# Patient Record
Sex: FEMALE | Race: WHITE | Employment: FULL TIME
[De-identification: names, ages, dates, MRNs, and addresses within clinical notes are randomized per-mention and may not be internally consistent; named-entity substitution may affect disease eponyms.]

---

## 2017-05-27 ENCOUNTER — HEALTH MAINTENANCE LETTER (OUTPATIENT)
Age: 27
End: 2017-05-27

## 2019-09-28 ENCOUNTER — HEALTH MAINTENANCE LETTER (OUTPATIENT)
Age: 29
End: 2019-09-28

## 2020-03-12 ENCOUNTER — TRANSFERRED RECORDS (OUTPATIENT)
Dept: HEALTH INFORMATION MANAGEMENT | Facility: CLINIC | Age: 30
End: 2020-03-12

## 2020-03-20 ENCOUNTER — TRANSFERRED RECORDS (OUTPATIENT)
Dept: HEALTH INFORMATION MANAGEMENT | Facility: CLINIC | Age: 30
End: 2020-03-20

## 2020-05-14 ENCOUNTER — PRE VISIT (OUTPATIENT)
Dept: NEUROLOGY | Facility: CLINIC | Age: 30
End: 2020-05-14

## 2020-05-14 NOTE — TELEPHONE ENCOUNTER
FUTURE VISIT INFORMATION      FUTURE VISIT INFORMATION:    Date: 5/19/2020    Time: 1030AM     [place] [facility] [place]  REFERRAL INFORMATION:    Referring provider:  Self     Referring providers clinic:      Reason for visit/diagnosis  Migraines     RECORDS REQUESTED FROM:       Clinic name Comments Records Status Imaging Status   [facility]   Requested  Requested          [facility] Head 3/5/2020 Care Everywhere Requested to PACS

## 2020-05-19 NOTE — PROGRESS NOTES
"Melia Braxton is a 29 year old female who is being evaluated via a billable video visit.      The patient has been notified of following:     \"This video visit will be conducted via a call between you and your physician/provider. We have found that certain health care needs can be provided without the need for an in-person physical exam.  This service lets us provide the care you need with a video conversation.  If a prescription is necessary we can send it directly to your pharmacy.  If lab work is needed we can place an order for that and you can then stop by our lab to have the test done at a later time.    Video visits are billed at different rates depending on your insurance coverage.  Please reach out to your insurance provider with any questions.    If during the course of the call the physician/provider feels a video visit is not appropriate, you will not be charged for this service.\"    Patient has given verbal consent for Video visit? Yes    How would you like to obtain your AVS? MyChart    Patient would like the video invitation sent by: Text to cell phone: [phone]    Will anyone else be joining your video visit? No        Video-Visit Details  Type of service:  Video Visit  Video Start Time: 1:03pm  Video End Time: 2:04pm  Originating Location (pt. Location): Home  Distant Location (provider location):  [facility] NEUROLOGY   Platform used for Video Visit: [employer]  Ernesto ESTEBAN Cha, MD    The clinical encounter between myself and Melia Braxton was performed utilizing a real-time video connection between my location and the patient's location, which was confirmed during the encounter.  Consent was obtained from the patient to perform this clinical encounter via telehealth modality.     [facility]    Neurology Consult    5/22/2020      Melia Braxton MRN# [id number]   YOB: 1990 Age: 29 year old      Primary care provider:   No primary care " provider on file.    Requesting provider:    Self-referred     Reason for Consult:   Headache      IMPRESSIONS:  29-year-old woman with history of significant concussion with impact on the right side of the head now with 3 episodes of prolonged migraine headache 1 of which was associated with visual aura.  There is concurrent neck stiffness on the same side of the head as the eye pain and the prior head injury.  Stress and perhaps coming off of the methamphetamine may have contributed to the initial headache.  Given the unusual nature of the headache and the prolonged time course we should rule out a structural cause of her symptoms.    Recommendations:  1.  Ultrasound neck  2.  CT chest  3.  Potential treatments will depend on imaging results but I discussed both physical therapy as well as a trial of a calcium channel blocker if there are no structural abnormalities to explain her symptoms  4.  Follow-up after imaging    HISTORY OF PRESENT ILLNESS:  Melia Braxton is a 29 year old female with PMH significant for concussion and attention deficit disorder previously on Ritalin (methamphetamine) who developed new onset of migraine headache with aura after stopping the Ritalin.  She also notes at the time of onset that she was undergoing a lot of stress.      She reports being out with her boyfriend at the onset of her first headache.  She was in a store where the lights were very bright and she started to experience spots in her vision and then eventual loss of vision.  The visual loss lasted about 20 minutes.  She was brought home and made to rest.  She remembers being extremely nauseated and then developing a severe headache.  The headache was a severe pain going through the right eye.  This lasted for 4 days before she went to the emergency room where she received a migraine cocktail which helped some of the pain.  She reports having had a negative head CT at the time.  The headache did not resolve until about  14 days had passed.  She was also very light sensitive during that entire time.  She also remembers a second visual aura occurring when she had tried to go to the grocery store during that.  The headache eventually resolved after she took some cannabis oil as well as smoked cannabis.  She was able to sleep deeply and then woke up the next day with resolution of the pain as well as light sensitivity and nausea.  The headache then converted to more of a global pain around the head rather than a sharp pain through the eye.      The patient does note being treated for attention deficit disorder that was diagnosed last November and having been initially put on Adderall which was then switched to Ritalin in January.  The dosing was quite inconsistent.  She was prescribed a dose of 10 mg/day but she had been splitting the dose and taking it maybe 2-3 times per week.  She had stopped taking the Ritalin altogether at the end of February.    Then around April 1-2, when her period started, she had another migraine but not as severe as the first one. It was still debilitating, however.  She did not have a visual aura but she did have the pain in the right eye again.  There was again nausea and light sensitivity. The cannabis did not work as well then.  This headache lasted 19 days.  She was treated with some sumatriptan 50mg with a second dose after two hours, but that didn't help.     On April 27-28th, she had her 3rd episode. She took the sumatriptan 25mg four times a day spread out over two days and that really seemed to reduce the headache.  This episode lasted about 15 days.  There is still some residual pressure over the right eye despite the severity of the pain being reduced..      She did not notice that the headache was worse laying down but it did wake her up in the middle of the night.  The headaches were worsened by being on the computer.  Sleep made it better.  The headache is more of pressure but when it's severe  it's more sharp and knife-like.  The eyeball cavity itself it feels like it is sensitive.  The eye ball does not change color.  But, the right eye does tear.  There is some right eyelid is drooping but not anymore during the headache.  There may be some nasal congestion but not specifically in the right side.    There is some right sided neck stiffness.  This started in March, also, at the time of the initial headache.  There is no neck swelling.  She may have had some right ear pressure.  There is no tinnitus or hearing loss.  She has felt clumsier and more imbalanced during this time.   There is no chest pain or chest heaviness.  There is no shortness of breath.  She did feel lethargic and tired.  There was never any arm symptoms.  There was no swelling in her face, chest, or arms.    On 2017, a falling [place] tree fell on the right side of her face.  She had had some alcohol that same night.  The next day she was very emotional, felt hung-over, and went to the ER.  MRI was normal.  She had had a headache then that lasted for about 7 months.  She was hypersomnolent for 3 months and incredibly light and sound sensitive and had chronic all-over head headache.  She was cared for by a neurologist and chiropractor and gradually improved.  She also tried a ketogenic diet for a month which was helpful after about 3 weeks.  This is when the headache improved.    She walks her dog an hour a day.  She does pilates.  She does not do any weight-lifting.  She has not had any recent head trauma.  She had bilateral breast implants in .    PAST MEDICAL HISTORY:  1. Asthma--hasn't used inhaler in years  2. Concussion as described above  3. Diagnosed with ADD earlier this year    PAST SURGICAL HISTORY:  1. Gallbladder and appendix removal   2. Breast implants in      SOCIAL HISTORY: Single, never smoker, occasional alcohol    FAMILY HISTORY:  Mother: Age 69, Healthy  Father:  age 56, multiple myeloma and  "amyloidosis 2006  Brother: schizoaffective disorder, ADD  Brother: ADD  Sister: Bad anxiety  Maternal cousins with migraine    ALLERGIES:  No Known Allergies     MEDICATIONS:    Current Outpatient Medications:      ALBUTEROL INHALER 17GM, This product no longer available, Disp: , Rfl:      Levalbuterol Tartrate (XOPENEX HFA IN), , Disp: , Rfl:      paragard intrauterine copper, 1 each by Intrauterine route once, Disp: , Rfl:   Sumatriptan 50mg prn    REVIEW OF SYSTEM:  Skin: negative  Eyes: negative  Ears/Nose/Throat: negative  Respiratory: negative  Cardiovascular: palpitations related to anxiety  Gastrointestinal: constipation leading up to the migraine, couple days  Genitourinary: negative  Musculoskeletal: negative  Neurologic: negative  Psychiatric: anxiety   Hematologic/Lymphatic/Immunologic: negative  Endocrine: negative    PHYSICAL EXAM:    General: Patient is no apparent distress.  Patient was sitting in her car in the  seat but not driving during the evaluation.    Mental Status: Alert and oriented to person, place, time, and situation.  Able to provide a good history.     Cranial Nerves: Visual fields full to confrontation.  Extraocular movements full.  No nystagmus.  Normal conversational hearing.  Face symmetric with normal movements. Tongue midline.  Normal shoulder elevation.        Adson's test for Thoracic Outlet Syndrome:  Negative    DATA:  MR angiogram 3- performed at [facility] was read as normal.  Accompanying MRI of the brain without contrast was read as \"near normal brain MRI aside from minimal punctate nonspecific supratentorial white matter T2 hyperintense foci, stable and of doubtful clinical significance.  Broad differential exists, including migrainous changes, chronic microvascular disease, gliosis from remote inflammatory or other vascular process.  The white matter changes are not suggestive for demyelination.    60-minutes were spent in evaluation, " examination, and counseling in real time with more than 50% spent in counseling and coordination of care.  After a review of the patient s situation, this visit was changed from an in-person visit to a video visit to reduce the risk of COVID-19 exposure.   The patient is being evaluated via a billable video visit.

## 2020-05-22 ENCOUNTER — VIRTUAL VISIT (OUTPATIENT)
Dept: NEUROLOGY | Facility: CLINIC | Age: 30
End: 2020-05-22
Payer: COMMERCIAL

## 2020-05-22 DIAGNOSIS — G54.0 THORACIC OUTLET SYNDROME: Primary | ICD-10-CM

## 2020-05-22 NOTE — LETTER
"5/22/2020     RE: Melia Braxton  [address]  [place] [address]     Dear Colleague,    Thank you for referring your patient, Melia Braxton, to the [facility] NEUROLOGY at [facility]. Please see a copy of my visit note below.    Melia Braxton is a 29 year old female who is being evaluated via a billable video visit.      The patient has been notified of following:     \"This video visit will be conducted via a call between you and your physician/provider. We have found that certain health care needs can be provided without the need for an in-person physical exam.  This service lets us provide the care you need with a video conversation.  If a prescription is necessary we can send it directly to your pharmacy.  If lab work is needed we can place an order for that and you can then stop by our lab to have the test done at a later time.    Video visits are billed at different rates depending on your insurance coverage.  Please reach out to your insurance provider with any questions.    If during the course of the call the physician/provider feels a video visit is not appropriate, you will not be charged for this service.\"    Patient has given verbal consent for Video visit? Yes    How would you like to obtain your AVS? [facility]    Patient would like the video invitation sent by: Text to cell phone: [phone]    Will anyone else be joining your video visit? No        Video-Visit Details  Type of service:  Video Visit  Video Start Time: 1:03pm  Video End Time: 2:04pm  Originating Location (pt. Location): Home  Distant Location (provider location):  [facility] NEUROLOGY   Platform used for Video Visit: Adina ESTEBAN Cha, MD    The clinical encounter between myself and Melia Braxton was performed utilizing a real-time video connection between my location and the patient's location, which was confirmed during the encounter.  Consent was obtained from the patient " to perform this clinical encounter via telehealth modality.     [facility]    Neurology Consult    5/22/2020      Melia Braxton MRN# [id number]   YOB: 1990 Age: 29 year old      Primary care provider:   No primary care provider on file.    Requesting provider:    Self-referred     Reason for Consult:   Headache      IMPRESSIONS:  29-year-old woman with history of significant concussion with impact on the right side of the head now with 3 episodes of prolonged migraine headache 1 of which was associated with visual aura.  There is concurrent neck stiffness on the same side of the head as the eye pain and the prior head injury.  Stress and perhaps coming off of the methamphetamine may have contributed to the initial headache.  Given the unusual nature of the headache and the prolonged time course we should rule out a structural cause of her symptoms.    Recommendations:  1.  Ultrasound neck  2.  CT chest  3.  Potential treatments will depend on imaging results but I discussed both physical therapy as well as a trial of a calcium channel blocker if there are no structural abnormalities to explain her symptoms  4.  Follow-up after imaging    HISTORY OF PRESENT ILLNESS:  Melia Braxton is a 29 year old female with PMH significant for concussion and attention deficit disorder previously on Ritalin (methamphetamine) who developed new onset of migraine headache with aura after stopping the Ritalin.  She also notes at the time of onset that she was undergoing a lot of stress.      She reports being out with her boyfriend at the onset of her first headache.  She was in a store where the lights were very bright and she started to experience spots in her vision and then eventual loss of vision.  The visual loss lasted about 20 minutes.  She was brought home and made to rest.  She remembers being extremely nauseated and then developing a severe headache.  The headache  was a severe pain going through the right eye.  This lasted for 4 days before she went to the emergency room where she received a migraine cocktail which helped some of the pain.  She reports having had a negative head CT at the time.  The headache did not resolve until about 14 days had passed.  She was also very light sensitive during that entire time.  She also remembers a second visual aura occurring when she had tried to go to the grocery store during that.  The headache eventually resolved after she took some cannabis oil as well as smoked cannabis.  She was able to sleep deeply and then woke up the next day with resolution of the pain as well as light sensitivity and nausea.  The headache then converted to more of a global pain around the head rather than a sharp pain through the eye.      The patient does note being treated for attention deficit disorder that was diagnosed last November and having been initially put on Adderall which was then switched to Ritalin in January.  The dosing was quite inconsistent.  She was prescribed a dose of 10 mg/day but she had been splitting the dose and taking it maybe 2-3 times per week.  She had stopped taking the Ritalin altogether at the end of February.    Then around April 1-2, when her period started, she had another migraine but not as severe as the first one. It was still debilitating, however.  She did not have a visual aura but she did have the pain in the right eye again.  There was again nausea and light sensitivity. The cannabis did not work as well then.  This headache lasted 19 days.  She was treated with some sumatriptan 50mg with a second dose after two hours, but that didn't help.     On April 27-28th, she had her 3rd episode. She took the sumatriptan 25mg four times a day spread out over two days and that really seemed to reduce the headache.  This episode lasted about 15 days.  There is still some residual pressure over the right eye despite the  severity of the pain being reduced..      She did not notice that the headache was worse laying down but it did wake her up in the middle of the night.  The headaches were worsened by being on the computer.  Sleep made it better.  The headache is more of pressure but when it's severe it's more sharp and knife-like.  The eyeball cavity itself it feels like it is sensitive.  The eye ball does not change color.  But, the right eye does tear.  There is some right eyelid is drooping but not anymore during the headache.  There may be some nasal congestion but not specifically in the right side.    There is some right sided neck stiffness.  This started in March, also, at the time of the initial headache.  There is no neck swelling.  She may have had some right ear pressure.  There is no tinnitus or hearing loss.  She has felt clumsier and more imbalanced during this time.   There is no chest pain or chest heaviness.  There is no shortness of breath.  She did feel lethargic and tired.  There was never any arm symptoms.  There was no swelling in her face, chest, or arms.    On April 1st, 2017, a falling [place] tree fell on the right side of her face.  She had had some alcohol that same night.  The next day she was very emotional, felt hung-over, and went to the ER.  MRI was normal.  She had had a headache then that lasted for about 7 months.  She was hypersomnolent for 3 months and incredibly light and sound sensitive and had chronic all-over head headache.  She was cared for by a neurologist and chiropractor and gradually improved.  She also tried a ketogenic diet for a month which was helpful after about 3 weeks.  This is when the headache improved.    She walks her dog an hour a day.  She does pilates.  She does not do any weight-lifting.  She has not had any recent head trauma.  She had bilateral breast implants in 2014.    PAST MEDICAL HISTORY:  1. Asthma--hasn't used inhaler in years  2. Concussion as described  "above  3. Diagnosed with ADD earlier this year    PAST SURGICAL HISTORY:  1. Gallbladder and appendix removal   2. Breast implants in      SOCIAL HISTORY: Single, never smoker, occasional alcohol    FAMILY HISTORY:  Mother: Age 69, Healthy  Father:  age 56, multiple myeloma and amyloidosis   Brother: schizoaffective disorder, ADD  Brother: ADD  Sister: Bad anxiety  Maternal cousins with migraine    ALLERGIES:  No Known Allergies     MEDICATIONS:    Current Outpatient Medications:      ALBUTEROL INHALER 17GM, This product no longer available, Disp: , Rfl:      Levalbuterol Tartrate (XOPENEX HFA IN), , Disp: , Rfl:      paragard intrauterine copper, 1 each by Intrauterine route once, Disp: , Rfl:   Sumatriptan 50mg prn    REVIEW OF SYSTEM:  Skin: negative  Eyes: negative  Ears/Nose/Throat: negative  Respiratory: negative  Cardiovascular: palpitations related to anxiety  Gastrointestinal: constipation leading up to the migraine, couple days  Genitourinary: negative  Musculoskeletal: negative  Neurologic: negative  Psychiatric: anxiety   Hematologic/Lymphatic/Immunologic: negative  Endocrine: negative    PHYSICAL EXAM:    General: Patient is no apparent distress.  Patient was sitting in her car in the  seat but not driving during the evaluation.    Mental Status: Alert and oriented to person, place, time, and situation.  Able to provide a good history.     Cranial Nerves: Visual fields full to confrontation.  Extraocular movements full.  No nystagmus.  Normal conversational hearing.  Face symmetric with normal movements. Tongue midline.  Normal shoulder elevation.        Adson's test for Thoracic Outlet Syndrome:  Negative    DATA:  MR angiogram 3- performed at [facility] was read as normal.  Accompanying MRI of the brain without contrast was read as \"near normal brain MRI aside from minimal punctate nonspecific supratentorial white matter T2 hyperintense foci, stable " and of doubtful clinical significance.  Broad differential exists, including migrainous changes, chronic microvascular disease, gliosis from remote inflammatory or other vascular process.  The white matter changes are not suggestive for demyelination.    60-minutes were spent in evaluation, examination, and counseling in real time with more than 50% spent in counseling and coordination of care.  After a review of the patient s situation, this visit was changed from an in-person visit to a video visit to reduce the risk of COVID-19 exposure.   The patient is being evaluated via a billable video visit.    Again, thank you for allowing me to participate in the care of your patient.      Sincerely,    Ernesto ESTEBAN Cha, MD

## 2020-06-03 ENCOUNTER — ANCILLARY PROCEDURE (OUTPATIENT)
Dept: ULTRASOUND IMAGING | Facility: CLINIC | Age: 30
End: 2020-06-03
Attending: PSYCHIATRY & NEUROLOGY
Payer: COMMERCIAL

## 2020-06-03 ENCOUNTER — ANCILLARY PROCEDURE (OUTPATIENT)
Dept: CT IMAGING | Facility: CLINIC | Age: 30
End: 2020-06-03
Attending: PSYCHIATRY & NEUROLOGY
Payer: COMMERCIAL

## 2020-06-03 DIAGNOSIS — G54.0 THORACIC OUTLET SYNDROME: ICD-10-CM

## 2020-06-03 LAB — RADIOLOGIST FLAGS: NORMAL

## 2020-06-03 RX ORDER — IOPAMIDOL 755 MG/ML
69 INJECTION, SOLUTION INTRAVASCULAR ONCE
Status: COMPLETED | OUTPATIENT
Start: 2020-06-03 | End: 2020-06-03

## 2020-06-03 RX ADMIN — IOPAMIDOL 69 ML: 755 INJECTION, SOLUTION INTRAVASCULAR at 09:11

## 2020-06-04 ENCOUNTER — TELEPHONE (OUTPATIENT)
Dept: NEUROLOGY | Facility: CLINIC | Age: 30
End: 2020-06-04

## 2020-06-18 NOTE — PROGRESS NOTES
"Melia Braxton is a 29 year old female who is being evaluated via a billable video visit.      The patient has been notified of following:     \"This video visit will be conducted via a call between you and your physician/provider. We have found that certain health care needs can be provided without the need for an in-person physical exam.  This service lets us provide the care you need with a video conversation.  If a prescription is necessary we can send it directly to your pharmacy.  If lab work is needed we can place an order for that and you can then stop by our lab to have the test done at a later time.    Video visits are billed at different rates depending on your insurance coverage.  Please reach out to your insurance provider with any questions.    If during the course of the call the physician/provider feels a video visit is not appropriate, you will not be charged for this service.\"    Patient has given verbal consent for Video visit? Yes    Will anyone else be joining your video visit? No    [facility]    Neurology Consult    6/19/2020      Melia Braxton MRN# [id number]   YOB: 1990 Age: 29 year old     Video-Visit Details    Type of service:  Video Visit  Video Start Time: 1:00 PM  Video End Time: 1:30 PM  Originating Location (pt. Location): Home  Distant Location (provider location):  [facility] NEUROLOGY   Platform used for Video Visit: Rasheed ESTEBAN Cha, MD    HISTORY:  Patient is a 29-year-old woman with a past history of having a tree fall on the right side of her head and body who had experienced 3 episodes of severe right sided headache along with right eye visual loss starting in March of this year.  She is following up from our visit on 5-22 during which appointment I was concerned that she had right sided venous thoracic outlet syndrome because of concurrent right-sided neck pain and paresthesias in the right hand.    Her ultrasound " TOS protocol from 6-03-20 showed complete obstruction of right subclavian venous flow in the 135 and 180 degree abduction positions.  She had a tripling of velocity from the neutral to the 90 degree position which typically indicates about a 75% luminal narrowing.  There was milder evidence of arterial TOS on the same side.  Left-sided subclavian velocities also tripled between 90 degrees and 135 degrees.  CT scan of the chest showed no externally obstructive lesions but mild narrowing of the right subclavian vein.    Since our appointment about 4 weeks ago she has had persistent pressure in the right eye.  Sometimes the pain becomes more acute and painful.  The headache tends to flare with exercising.  If she is laying down or if her neck is cramped this can also cause the headache.  The right hand numbness and tingling is present when she is using her laptop.  It is typically her right third and fourth finger that is affected as well as involving some paresthesias going up her arm.  The right neck area tends to be sore.  She has not had any chest pain.    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  She has classic features of symptoms worsening in the reclined position.  We spent the entirety of our appointment discussing further management of her symptoms with both short-term and long-term management issues.    Plan:  1.  Lasix 20 mg titrated twice a day  2.  Physical therapy focused on right sided TOS.  Would try at least 8 weeks of physical therapy unless the therapy makes her symptoms worse  3.  Elevate head of the bed  4.  We broached the subject of future surgical intervention.  She does have bilateral breast implants which may be a risk factor for the development of the TOS but if surgery is planned in the future it is probably best for her to go straight to first rib removal  with resection of fibrous banding that is typically seen in these cases.  5.  Patient to touch base with me in about 4 weeks after physical therapy has started.  6.  Follow-up low-dose chest CT in 6 months for left lower lobe nodule.    DATA:  Arterial and venous ultrasound Doppler assessment of the upper  extremities bilaterally dated 6/3/2020 10:01 AM     Clinical information: 29-year-old woman with right sided neck pain and  visual loss concern for venous TOS; Thoracic outlet syndrome     Technique: Ultrasound Doppler assessment of the subclavian veins of  the upper extremities was performed in the neutral position, at 90  degrees abduction, at 135 degrees abduction and at 180 degrees  abduction. PPG waveforms placed on the index fingers of both hands and  obtained with various provocation maneuvers.     Ordering provider: KRUPA RODRIGUEZ CHA  Findings:  Right Upper Extremity:  Internal jugular sitting at 0 degrees: 72 cm/sec  Internal jugular onset: 52 cm/s  Innominate: 78 cm/sec  Subclavian medial: 71 cm/sec  Subclavian mid: 30 cm/sec  Subclavian lateral: 20 cm/s  Axillary: 31 cm/sec     Subclavian mid at 0 degrees: 19 cm/sec  Subclavian mid at 90 degrees: 63 cm/sec  Subclavian mid at 135 degrees: 0 cm/sec  Subclavian mid at 180 degrees: 0 cm/sec     Arterial assessment:     Baseline: Mildly abnormal  Arms at 90 degrees: Mildly abnormal  Arms at 180 degrees: Severely abnormal  : Moderately abnormal   head right: Mildly abnormal   head left:  Mildly abnormal     Left Upper Extremity:     Internal jugular sitting at 0 degrees: 90 cm/sec  Internal jugular onset: 58 cm/s  Innominate: 52 cm/sec  Subclavian medial: 31 cm/sec  Subclavian mid: 27 cm/sec  Subclavian lateral: 29 cm/s  Axillary: 24 cm/sec     Subclavian mid at 0 degrees: 51 cm/sec  Subclavian mid at 90 degrees: 49 cm/sec  Subclavian mid at 135 degrees: 152 cm/sec  Subclavian mid at 180 degrees: 129 cm/sec     Arterial  assessment:     Baseline: Normal  Arms at 90 degrees: Normal  Arms at 180 degrees: Severely abnormal  : Moderately abnormal   head right: Normal   head left:  Normal                                                            Impression:     1. Venous: Absent right subclavian flow with arms at 135 and 180  degrees. Findings consistent with right venous thoracic outlet  syndrome.     2. Arterial: Right greater than left severely diminished arterial flow  with arms at 180 degrees or in  position. Findings can be seen  in setting of arterial thoracic outlet syndrome.     I have personally reviewed the examination and initial interpretation  and I agree with the findings.     MELISSA BARKER MD    CT of the chest with contrast     HISTORY: 29-year-old with concern for right venous thoracic outlet  syndrome     COMPARISON STUDY: None.     FINDINGS: Moderate narrowing of the right subclavian vein at the  thoracic inlet between the right clavicle and right first rib with a  right upper extremity contrast injection. No evidence of cervical rib.  Venous collaterals are not present. No mediastinal, hilar or axillary  adenopathy. Heart is not enlarged. Main pulmonary artery and aorta are  not enlarged. No pleural or pericardial effusion. 7 mm groundglass  nodule left lower lobe image 262 series 4. 4 mm pleural nodule in the  right major fissure probably an intrapulmonary lymph node image 135.  Bilateral breast implants.     Evaluation of the upper abdomen is limited.     Bones: No suspicious bony lesions.                                                            IMPRESSION:  1. Mild narrowing of the right subclavian vein at the thoracic inlet  without cervical rib. No venous collateral formation identified.  2. 7 mm groundglass nodule in the left lower lobe. Follow-up with  low-dose chest CT in 6 months per Fleischner guidelines. Emphasis for  low-dose imaging advised given patient's young  age.     [Recommend Follow Up: Lung nodule]     This report will be copied to the [facility] to ensure a  provider acknowledges the finding.      ERMELINDA LANE MD    The clinical encounter between myself and Melia Crumpsusu was performed utilizing a real-time video connection between my location and the patient's location, which was confirmed during the encounter.  Consent was obtained from the patient to perform this clinical encounter via telehealth modality.     30-minutes were spent in evaluation, examination, and counseling in real time with more than 50% spent in counseling and coordination of care.    After a review of the patient s situation, this visit was changed from an in-person visit to a video visit to reduce the risk of COVID-19 exposure.     The patient is being evaluated via a billable video visit.

## 2020-06-19 ENCOUNTER — VIRTUAL VISIT (OUTPATIENT)
Dept: NEUROLOGY | Facility: CLINIC | Age: 30
End: 2020-06-19
Payer: COMMERCIAL

## 2020-06-19 DIAGNOSIS — G54.0 TOS (THORACIC OUTLET SYNDROME): ICD-10-CM

## 2020-06-19 DIAGNOSIS — R60.0 LOCALIZED EDEMA: Primary | ICD-10-CM

## 2020-06-19 RX ORDER — FUROSEMIDE 20 MG
20 TABLET ORAL 2 TIMES DAILY
Qty: 60 TABLET | Refills: 3 | Status: SHIPPED | OUTPATIENT
Start: 2020-06-19 | End: 2021-04-28

## 2020-06-19 NOTE — PROGRESS NOTES
"Melia Braxton is a 29 year old female who is being evaluated via a billable video visit.      The patient has been notified of following:     \"This video visit will be conducted via a call between you and your physician/provider. We have found that certain health care needs can be provided without the need for an in-person physical exam.  This service lets us provide the care you need with a video conversation.  If a prescription is necessary we can send it directly to your pharmacy.  If lab work is needed we can place an order for that and you can then stop by our lab to have the test done at a later time.    Video visits are billed at different rates depending on your insurance coverage.  Please reach out to your insurance provider with any questions.    If during the course of the call the physician/provider feels a video visit is not appropriate, you will not be charged for this service.\"    Patient has given verbal consent for Video visit? YES  PLEASE SEND LINK TO  [email or web address]    Will anyone else be joining your video visit? {:[id number]}  {If patient encounters technical issues they should call [phone] :[id number]}      Video-Visit Details    Type of service:  Video Visit    Video Start Time: {video visit start/end time:152948}  Video End Time: {video visit start/end time:152948}    Originating Location (pt. Location): {video visit patient location:210099::\"Home\"}    Distant Location (provider location):  [facility] NEUROLOGY     Platform used for Video Visit: {Virtual Visit Platforms:750368::\"[facility]\"}    Suraj Khan EMT  "

## 2020-06-19 NOTE — LETTER
"6/19/2020       RE: Melia Braxton  [address] [facility] [address]     Dear Colleague,    Thank you for referring your patient, Melia Braxton, to the [facility] NEUROLOGY at [facility]. Please see a copy of my visit note below.    Melia Braxton is a 29 year old female who is being evaluated via a billable video visit.      The patient has been notified of following:     \"This video visit will be conducted via a call between you and your physician/provider. We have found that certain health care needs can be provided without the need for an in-person physical exam.  This service lets us provide the care you need with a video conversation.  If a prescription is necessary we can send it directly to your pharmacy.  If lab work is needed we can place an order for that and you can then stop by our lab to have the test done at a later time.    Video visits are billed at different rates depending on your insurance coverage.  Please reach out to your insurance provider with any questions.    If during the course of the call the physician/provider feels a video visit is not appropriate, you will not be charged for this service.\"    Patient has given verbal consent for Video visit? Yes    Will anyone else be joining your video visit? No    [facility]    Neurology Consult    6/19/2020      Melia Braxton MRN# [id number]   YOB: 1990 Age: 29 year old     Video-Visit Details    Type of service:  Video Visit  Video Start Time: 1:00 PM  Video End Time: 1:30 PM  Originating Location (pt. Location): Home  Distant Location (provider location):  [facility] NEUROLOGY   Platform used for Video Visit: Rasheed ESTEBAN Cha, MD    HISTORY:  Patient is a 29-year-old woman with a past history of having a tree fall on the right side of her head and body who had experienced 3 episodes of severe right sided headache along with right eye " visual loss starting in March of this year.  She is following up from our visit on 5-22 during which appointment I was concerned that she had right sided venous thoracic outlet syndrome because of concurrent right-sided neck pain and paresthesias in the right hand.    Her ultrasound TOS protocol from 6-30 showed complete obstruction of right subclavian venous flow in the 135 and 180 degree abduction positions.  She had a tripling of velocity from the neutral to the 90 degree position which typically indicates about a 75% luminal narrowing.  There was milder evidence of arterial TOS on the same side.  Left-sided subclavian velocities also tripled between 90 degrees and 135 degrees.  CT scan of the chest showed no externally obstructive lesions but mild narrowing of the right subclavian vein.    Since our appointment about 4 weeks ago she has had persistent pressure in the right eye.  Sometimes the pain becomes more acute and painful.  The headache tends to flare with exercising.  If she is laying down or if her neck is cramped this can also cause the headache.  The right hand numbness and tingling is present when she is using her laptop.  It is typically her right third and fourth finger that is affected as well as involving some paresthesias going up her arm.  The right neck area tends to be sore.  She has not had any chest pain.    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  She has classic features of symptoms worsening in the reclined position.  We spent the entirety of our appointment discussing further management of her symptoms with both short-term and long-term management issues.    Plan:  1.  Lasix 20 mg titrated twice a day  2.  Physical therapy focused on right sided TOS.  Would try at least 8 weeks of physical therapy unless the therapy makes her symptoms worse  3.  Elevate  head of the bed  4.  We broached the subject of future surgical intervention.  She does have bilateral breast implants which may be a risk factor for the development of the TOS but if surgery is planned in the future it is probably best for her to go straight to first rib removal with resection of fibrous banding that is typically seen in these cases.  5.  Patient to touch base with me in about 4 weeks after physical therapy has started.  6.  Follow-up low-dose chest CT in 6 months for left lower lobe nodule.    DATA:  Arterial and venous ultrasound Doppler assessment of the upper  extremities bilaterally dated 6/3/2020 10:01 AM     Clinical information: 29-year-old woman with right sided neck pain and  visual loss concern for venous TOS; Thoracic outlet syndrome     Technique: Ultrasound Doppler assessment of the subclavian veins of  the upper extremities was performed in the neutral position, at 90  degrees abduction, at 135 degrees abduction and at 180 degrees  abduction. PPG waveforms placed on the index fingers of both hands and  obtained with various provocation maneuvers.     Ordering provider: KRUPA RODRIGUEZ CHA  Findings:  Right Upper Extremity:  Internal jugular sitting at 0 degrees: 72 cm/sec  Internal jugular onset: 52 cm/s  Innominate: 78 cm/sec  Subclavian medial: 71 cm/sec  Subclavian mid: 30 cm/sec  Subclavian lateral: 20 cm/s  Axillary: 31 cm/sec     Subclavian mid at 0 degrees: 19 cm/sec  Subclavian mid at 90 degrees: 63 cm/sec  Subclavian mid at 135 degrees: 0 cm/sec  Subclavian mid at 180 degrees: 0 cm/sec     Arterial assessment:     Baseline: Mildly abnormal  Arms at 90 degrees: Mildly abnormal  Arms at 180 degrees: Severely abnormal  : Moderately abnormal   head right: Mildly abnormal   head left:  Mildly abnormal     Left Upper Extremity:     Internal jugular sitting at 0 degrees: 90 cm/sec  Internal jugular onset: 58 cm/s  Innominate: 52 cm/sec  Subclavian medial: 31  cm/sec  Subclavian mid: 27 cm/sec  Subclavian lateral: 29 cm/s  Axillary: 24 cm/sec     Subclavian mid at 0 degrees: 51 cm/sec  Subclavian mid at 90 degrees: 49 cm/sec  Subclavian mid at 135 degrees: 152 cm/sec  Subclavian mid at 180 degrees: 129 cm/sec     Arterial assessment:     Baseline: Normal  Arms at 90 degrees: Normal  Arms at 180 degrees: Severely abnormal  : Moderately abnormal   head right: Normal   head left:  Normal                                                            Impression:     1. Venous: Absent right subclavian flow with arms at 135 and 180  degrees. Findings consistent with right venous thoracic outlet  syndrome.     2. Arterial: Right greater than left severely diminished arterial flow  with arms at 180 degrees or in  position. Findings can be seen  in setting of arterial thoracic outlet syndrome.     I have personally reviewed the examination and initial interpretation  and I agree with the findings.     MELISSA BARKER MD    CT of the chest with contrast     HISTORY: 29-year-old with concern for right venous thoracic outlet  syndrome     COMPARISON STUDY: None.     FINDINGS: Moderate narrowing of the right subclavian vein at the  thoracic inlet between the right clavicle and right first rib with a  right upper extremity contrast injection. No evidence of cervical rib.  Venous collaterals are not present. No mediastinal, hilar or axillary  adenopathy. Heart is not enlarged. Main pulmonary artery and aorta are  not enlarged. No pleural or pericardial effusion. 7 mm groundglass  nodule left lower lobe image 262 series 4. 4 mm pleural nodule in the  right major fissure probably an intrapulmonary lymph node image 135.  Bilateral breast implants.     Evaluation of the upper abdomen is limited.     Bones: No suspicious bony lesions.                                                            IMPRESSION:  1. Mild narrowing of the right subclavian vein at the thoracic  inlet  without cervical rib. No venous collateral formation identified.  2. 7 mm groundglass nodule in the left lower lobe. Follow-up with  low-dose chest CT in 6 months per Fleischner guidelines. Emphasis for  low-dose imaging advised given patient's young age.     [Recommend Follow Up: Lung nodule]     This report will be copied to the [facility] to ensure a  provider acknowledges the finding.      ERMELINDA LANE MD    The clinical encounter between myself and Melia Braxton was performed utilizing a real-time video connection between my location and the patient's location, which was confirmed during the encounter.  Consent was obtained from the patient to perform this clinical encounter via telehealth modality.     30-minutes were spent in evaluation, examination, and counseling in real time with more than 50% spent in counseling and coordination of care.    After a review of the patient s situation, this visit was changed from an in-person visit to a video visit to reduce the risk of COVID-19 exposure.     The patient is being evaluated via a billable video visit.      Again, thank you for allowing me to participate in the care of your patient.      Sincerely,    Ernesto ESTEBAN Cha, MD

## 2020-06-24 ENCOUNTER — TELEPHONE (OUTPATIENT)
Dept: OTHER | Facility: CLINIC | Age: 30
End: 2020-06-24

## 2020-06-24 DIAGNOSIS — G54.0 TOS (THORACIC OUTLET SYNDROME): Primary | ICD-10-CM

## 2020-06-24 NOTE — TELEPHONE ENCOUNTER
Pt referred to [facility] by Ernesto Fallon MD  for TOS.    Patient has upper extremity venous and arterial US in [facility] on 6/3/20.     Pt needs to be scheduled for in-person consult with vascular surgery.  Will route to scheduling to coordinate an appointment at next available.    Marina RUTH, RN    [facility]  Office: [phone]  Fax: [phone]

## 2020-06-24 NOTE — TELEPHONE ENCOUNTER
June 24, 2020    Attempted to contact patient regarding provider referral to schedule a New Patient Consult appointment with Vascular Surgery. Patient's mailbox was full. Unable to LM. Will try again.     Aylin Damon    [facility]  Office: [phone]  Fax [phone]

## 2020-06-25 ENCOUNTER — THERAPY VISIT (OUTPATIENT)
Dept: PHYSICAL THERAPY | Facility: CLINIC | Age: 30
End: 2020-06-25
Attending: PSYCHIATRY & NEUROLOGY
Payer: COMMERCIAL

## 2020-06-25 DIAGNOSIS — G54.0 TOS (THORACIC OUTLET SYNDROME): ICD-10-CM

## 2020-06-25 PROCEDURE — 97161 PT EVAL LOW COMPLEX 20 MIN: CPT | Mod: GP | Performed by: PHYSICAL THERAPIST

## 2020-06-25 PROCEDURE — 97530 THERAPEUTIC ACTIVITIES: CPT | Mod: GP | Performed by: PHYSICAL THERAPIST

## 2020-06-25 PROCEDURE — 97110 THERAPEUTIC EXERCISES: CPT | Mod: GP | Performed by: PHYSICAL THERAPIST

## 2020-06-25 NOTE — PROGRESS NOTES
"Physical Therapy Virtual Initial Visit      The patient has been notified of following:     \"This virtual visit will be conducted between you and your provider. We have found that certain health care needs can be provided without the need for physical presence.  This service lets us provide the care you need with a virtual visit.\"    Due to external, as well as internal [facility] management of the COVID-19 Virus, Melia Braxton was not seen in our clinic.  As a substitution, we implemented a virtual visit to manage this patient's condition utilizing the PTRx virtual visit platform via the patient s existing code.  The provider, Khurram Casey, reviewed the patient's chart, PTRx prescription, and spoke with the patient to determine the following telemedicine visit is appropriate and effective for the patient's care.    The following type of visit was completed:   Video Visit:  The Advanced Patient Carex platform uses a synchronous HIPAA compliant video stream for this patient encounter.         Subjective:  29yof (RHD) with past history of having a tree fall on the right side of her head and body who had experienced 3 episodes of severe right sided headache along with right eye visual loss starting in March of this year. Had US diagnosis (see below), consult with surgery pending.  Having new symptoms, swelling/puffiness/numbness/tingling in bilateral hands (started R, now L), some chest pain as well. Has begun to have some weakness in bilateral upper arms.   Her ultrasound TOS protocol from 6/30 showed complete obstruction of right subclavian venous flow in the 135 and 180 degree abduction positions. Severe venous and milder arterial thoracic outlet syndrome.   Referred to PT for evaluation and treatment, first rib mobilization and stretching concepts.   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 Always works from home, probably not the best ergonomic setup. Typical physical activities are pilates a few " times per week (not as much recently), pole.   HPI              Objective:  CERVICAL/THORACIC EXAMINATION    Posture: Forward head on neck and Rounded shoulders (both mild)    Active ROM ROM   Flexion Mild loss, ++ tightness posteriorly, equivalent R/L   Extension Full ROM, +++ tightness anteriorly, equivalent R/L   Protraction na   Retraction na    L R   Rotation Min loss, +++ R anterior tightness Full, - tightness   Sidebend Mod loss, +++ R tightness Mod loss, +++ L tightness       Sensory Test (patient reported): R dorsal forearm decreased sensation to light touch    TOS Cluster:    Right Left   Adson's NA NA   EAST/Don Positive NA   ULTT-median Positive NA   ULTT-ulnar Positive NA   ULTT-radial Negative NA   CRLF (1st rib mobility) Positive Negative         System  Physical Exam  General   ROS    PTRx Content from today's visit:  Exercise Name: Wand Shoulder Internal Rotation - Reps: 10 - Sessions: 3-4, Notes: LEFT ARM  First pull left arm towards midline.   Once you can get to midline, start working up your back.   Exercise Name: Supine AAROM Shoulder Flexion - Reps: 10, Notes: RIGHT ARM  Brief pause/hold at the end of the motion  Exercise Name: Wand Shoulder External Rotation in Neutral - Reps: 10, Notes: RIGHT ARM  Brief pause/hold at the end of the motion  Exercise Name: Standing Passive Shoulder Flexion - Reps: 10, Notes: RIGHT ARM  Brief pause/hold at the end of the motion  Exercise Name: Sidelying Shoulder Flexion, Sets: 2 - Reps: 10, Notes: RIGHT ARM  Just raise arm to shoulder/face height, then return to start position.   Add a third set of 10 reps as you are able to.    Assessment/Plan:     Patient is a 29 year old female with cervical and thoracic complaints.    Patient has the following significant findings with corresponding treatment plan.                Diagnosis 1:  Severe venous and mild arterial TOS on R side  Pain -  hot/cold therapy, manual therapy, splint/taping/bracing/orthotics, self  management and directional preference exercise  Decreased ROM/flexibility - manual therapy, therapeutic exercise and home program  Decreased strength - therapeutic exercise, therapeutic activities and home program  Impaired muscle performance - neuro re-education  Decreased function - therapeutic activities  Impaired posture - neuro re-education    Therapy Evaluation Codes:   1) History comprised of:   Personal factors that impact the plan of care:      None.    Comorbidity factors that impact the plan of care are:      None.     Medications impacting care: None.  2) Examination of Body Systems comprised of:   Body structures and functions that impact the plan of care:      Cervical spine and Thoracic Spine.   Activity limitations that impact the plan of care are:      Bathing, Cooking, Driving, Dressing, Grasping, Lifting, Reading/Computer work, Running, Sports, Working and Sleeping.  3) Clinical presentation characteristics are:   Stable/Uncomplicated.  4) Decision-Making    Low complexity using standardized patient assessment instrument and/or measureable assessment of functional outcome.  Cumulative Therapy Evaluation is: Low complexity.    Previous and current functional limitations:  (See Goal Flow Sheet for this information)    Short term and Long term goals: (See Goal Flow Sheet for this information)     Communication ability:  Patient appears to be able to clearly communicate and understand verbal and written communication and follow directions correctly.  Treatment Explanation - The following has been discussed with the patient:   RX ordered/plan of care  Anticipated outcomes  Possible risks and side effects  This patient would benefit from PT intervention to resume normal activities.   Rehab potential is good.    Frequency:  1 X week, once daily  Duration:  for 4 months tapering to 2 X a month over 1 months  Discharge Plan:  Achieve all LTG.  Independent in home treatment program.  Reach maximal  therapeutic benefit.    Please refer to the daily flowsheet for treatment today, total treatment time and time spent performing 1:1 timed codes.       Virtual visit contact time    Time of service began: 8:40 AM  Time of service ended: 9:18 AM  Total Time for set up, visit, and documentation: 40 minutes    Payor: [employer] / Plan: [employer] [place] / Product Type: Indemnity /     Procedure Code/s   Therapeutic Exercise (17712): 10 minutes  Therapeutic Activities (81726): 18 minutes  Evaluation: 10 minutes    I have reviewed the note as documented above.  This accurately captures the substance of my conversation with the patient.  Provider location: [place], [place] (City/State)  Patient location: Home    ___________________________________________________

## 2020-06-25 NOTE — TELEPHONE ENCOUNTER
June 25, 2020    Patient is scheduled for an in-person new patient consult appointment on 7/20/2020 with Dr. Ruiz at [facility] per MD/RN determination and correspondence.     Aylin Damon    [facility]  Office: [phone]  Fax [phone]

## 2020-07-06 ENCOUNTER — THERAPY VISIT (OUTPATIENT)
Dept: PHYSICAL THERAPY | Facility: CLINIC | Age: 30
End: 2020-07-06
Payer: COMMERCIAL

## 2020-07-06 DIAGNOSIS — G54.0 TOS (THORACIC OUTLET SYNDROME): ICD-10-CM

## 2020-07-06 PROCEDURE — 97112 NEUROMUSCULAR REEDUCATION: CPT | Mod: GP | Performed by: PHYSICAL THERAPIST

## 2020-07-06 PROCEDURE — 97140 MANUAL THERAPY 1/> REGIONS: CPT | Mod: GP | Performed by: PHYSICAL THERAPIST

## 2020-07-09 ENCOUNTER — THERAPY VISIT (OUTPATIENT)
Dept: PHYSICAL THERAPY | Facility: CLINIC | Age: 30
End: 2020-07-09
Payer: COMMERCIAL

## 2020-07-09 DIAGNOSIS — G54.0 TOS (THORACIC OUTLET SYNDROME): ICD-10-CM

## 2020-07-09 PROCEDURE — 97112 NEUROMUSCULAR REEDUCATION: CPT | Mod: GP | Performed by: PHYSICAL THERAPIST

## 2020-07-09 PROCEDURE — 97140 MANUAL THERAPY 1/> REGIONS: CPT | Mod: GP | Performed by: PHYSICAL THERAPIST

## 2020-07-13 ENCOUNTER — THERAPY VISIT (OUTPATIENT)
Dept: PHYSICAL THERAPY | Facility: CLINIC | Age: 30
End: 2020-07-13
Payer: COMMERCIAL

## 2020-07-13 DIAGNOSIS — G54.0 TOS (THORACIC OUTLET SYNDROME): ICD-10-CM

## 2020-07-13 PROCEDURE — 97140 MANUAL THERAPY 1/> REGIONS: CPT | Mod: GP | Performed by: PHYSICAL THERAPIST

## 2020-07-13 PROCEDURE — 97112 NEUROMUSCULAR REEDUCATION: CPT | Mod: GP | Performed by: PHYSICAL THERAPIST

## 2020-07-20 ENCOUNTER — OFFICE VISIT (OUTPATIENT)
Dept: OTHER | Facility: CLINIC | Age: 30
End: 2020-07-20
Attending: SURGERY
Payer: COMMERCIAL

## 2020-07-20 ENCOUNTER — THERAPY VISIT (OUTPATIENT)
Dept: PHYSICAL THERAPY | Facility: CLINIC | Age: 30
End: 2020-07-20
Payer: COMMERCIAL

## 2020-07-20 VITALS
WEIGHT: 155 LBS | BODY MASS INDEX: 24.91 KG/M2 | SYSTOLIC BLOOD PRESSURE: 107 MMHG | DIASTOLIC BLOOD PRESSURE: 64 MMHG | OXYGEN SATURATION: 99 % | HEIGHT: 66 IN | HEART RATE: 87 BPM | RESPIRATION RATE: 16 BRPM

## 2020-07-20 DIAGNOSIS — G54.0 TOS (THORACIC OUTLET SYNDROME): ICD-10-CM

## 2020-07-20 PROCEDURE — 97112 NEUROMUSCULAR REEDUCATION: CPT | Mod: GP | Performed by: PHYSICAL THERAPIST

## 2020-07-20 PROCEDURE — G0463 HOSPITAL OUTPT CLINIC VISIT: HCPCS

## 2020-07-20 PROCEDURE — 99244 OFF/OP CNSLTJ NEW/EST MOD 40: CPT | Mod: ZP | Performed by: SURGERY

## 2020-07-20 PROCEDURE — 97140 MANUAL THERAPY 1/> REGIONS: CPT | Mod: GP | Performed by: PHYSICAL THERAPIST

## 2020-07-20 ASSESSMENT — MIFFLIN-ST. JEOR: SCORE: 1449.83

## 2020-07-20 NOTE — PROGRESS NOTES
"Melia Braxton is a 29 year old female who presents for:  Chief Complaint   Patient presents with     RECHECK     Wellness Screening Complete *[facility] 07/17/20 - NEW IN-PERSON CONSULT: Pt referred to [facility] by Ernesto Fallon MD  for TOS; Patient has upper extremity venous and arterial US in [facility] on 6/3/20. TAV        Vitals:    Vitals:    07/20/20 1503 07/20/20 1504   BP: 115/74 107/64   BP Location: Right arm Left arm   Patient Position: Chair Chair   Cuff Size: Adult Regular Adult Regular   Pulse: 87    Resp: 16    SpO2: 99%    Weight: 155 lb (70.3 kg)    Height: 5' 6\" (1.676 m)        BMI:  Estimated body mass index is 25.02 kg/m  as calculated from the following:    Height as of this encounter: 5' 6\" (1.676 m).    Weight as of this encounter: 155 lb (70.3 kg).    Pain Score:  Data Unavailable        Lili Swain CMA    "

## 2020-07-21 NOTE — PROGRESS NOTES
I had the pleasure of seeing Ms. Melia Braxton in the vascular surgery clinic at the request from Dr. Fallon for evaluation of venous thoracic outlet syndrome.  Melia is 29 years old.  Patient tells me that since March of this year he has noticed throbbing right-sided headache behind her right eye.  This is exacerbated by lying down.  She has undergone work-up with MR angiography which was unremarkable.  MRI of the brain was also unremarkable.    Patient also reports numbness and pain of both upper extremities with abduction.    Many years ago of potted plant fell on her head on the right side following which she had a concussion which lasted about 9 months and was associated with headaches and behavioral changes.    Past medical history: Migraine headaches.    Past surgical history: Bilateral breast augmentation.    Social history: She does not smoke or chew tobacco.  She occasionally consumes alcohol.    Review of systems as noted in history of presenting illness.    On examination: She appears comfortable and she is in no acute distress.  She is very pleasant and cooperative.  Vital signs are reviewed.  HEENT: Head is atraumatic and normocephalic.  Eyes: Extraocular motions are intact, sclerae are anicteric.  Mental: Alert and oriented x4, judgment and insight are good.  Psychiatric: Mood and affect are appropriate.  She was tearful being concerned about her situation.  Cardiac: Regular rate and rhythm, S1 plus S2 +0.  Chest: Clear to auscultation bilaterally.  Abdomen: Soft and nontender.  Extended arm stress test is positive on both sides with reproduction of numbness and tingling in both upper extremities.  Right is worse than left.  When her right upper extremity was placed in 180 degrees abduction there was significant reduction in the quality of the right radial pulse.  It also reproduces symptoms of her throbbing headache behind the right eye.  When her left upper extremity was placed in 180 degrees  abduction there was significant reduction in the quality of the left radial pulse.  Breast implantation surgical scars are through inframammary approach.    Imaging data:  Arterial and venous ultrasound Doppler assessment of the upper  extremities bilaterally dated 6/3/2020 10:01 AM     Clinical information: 29-year-old woman with right sided neck pain and  visual loss concern for venous TOS; Thoracic outlet syndrome     Technique: Ultrasound Doppler assessment of the subclavian veins of  the upper extremities was performed in the neutral position, at 90  degrees abduction, at 135 degrees abduction and at 180 degrees  abduction. PPG waveforms placed on the index fingers of both hands and  obtained with various provocation maneuvers.     Ordering provider: KRUPA RODRIGUEZ CHA     Findings:     Right Upper Extremity:        Internal jugular sitting at 0 degrees: 72 cm/sec  Internal jugular onset: 52 cm/s  Innominate: 78 cm/sec  Subclavian medial: 71 cm/sec  Subclavian mid: 30 cm/sec  Subclavian lateral: 20 cm/s  Axillary: 31 cm/sec     Subclavian mid at 0 degrees: 19 cm/sec  Subclavian mid at 90 degrees: 63 cm/sec  Subclavian mid at 135 degrees: 0 cm/sec  Subclavian mid at 180 degrees: 0 cm/sec     Arterial assessment:     Baseline: Mildly abnormal  Arms at 90 degrees: Mildly abnormal  Arms at 180 degrees: Severely abnormal  : Moderately abnormal   head right: Mildly abnormal   head left:  Mildly abnormal     Left Upper Extremity:     Internal jugular sitting at 0 degrees: 90 cm/sec  Internal jugular onset: 58 cm/s  Innominate: 52 cm/sec  Subclavian medial: 31 cm/sec  Subclavian mid: 27 cm/sec  Subclavian lateral: 29 cm/s  Axillary: 24 cm/sec     Subclavian mid at 0 degrees: 51 cm/sec  Subclavian mid at 90 degrees: 49 cm/sec  Subclavian mid at 135 degrees: 152 cm/sec  Subclavian mid at 180 degrees: 129 cm/sec     Arterial assessment:     Baseline: Normal  Arms at 90 degrees: Normal  Arms at 180  degrees: Severely abnormal  : Moderately abnormal   head right: Normal   head left:  Normal                                                                      Impression:     1. Venous: Absent right subclavian flow with arms at 135 and 180  degrees. Findings consistent with right venous thoracic outlet  syndrome.     2. Arterial: Right greater than left severely diminished arterial flow  with arms at 180 degrees or in  position. Findings can be seen  in setting of arterial thoracic outlet syndrome.     I have personally reviewed the examination and initial interpretation  and I agree with the findings.     MELISSA BARKER MD     Diagnosis:  1.  Right upper extremity arterial, venous and neurogenic thoracic outlet syndrome.    2.  Left upper extremity arterial and neurogenic thoracic outlet syndrome.    Plan: I explained the pathophysiology of disease to her in detail.  Her symptoms of the right-sided retrobulbar ocular headache is reproduced by her arm abduction to 180 degrees.  This alongside with the findings on the venous sonography is consistent with venous, arterial and neurogenic thoracic outlet syndrome of the right upper extremity.  I have advised her if her symptoms continue to worsen or are not relieved with physical therapy then we should consider right first rib resection with scalenectomy.  Her scar for the breast implantation is inframammary and there is no scar in the right axillary area.  I do not foresee the breast implantation being in any way hindrance to our approach to the right first rib through the transaxillary approach.  I also reassured her that her risk of cerebrovascular accident is low.  She does not engage in any repetitive sport that requires right upper extremity movements.  Her venous compression is noted at arms at 180 degrees.    I have asked her to call us back if she feels that the symptoms are not being relieved or are getting worse for  consideration for surgery.    Total time spent: 47 minutes, greater than 50% on face-to-face counseling.

## 2020-07-23 ENCOUNTER — THERAPY VISIT (OUTPATIENT)
Dept: PHYSICAL THERAPY | Facility: CLINIC | Age: 30
End: 2020-07-23
Payer: COMMERCIAL

## 2020-07-23 DIAGNOSIS — G54.0 TOS (THORACIC OUTLET SYNDROME): ICD-10-CM

## 2020-07-23 PROCEDURE — 97112 NEUROMUSCULAR REEDUCATION: CPT | Mod: GP | Performed by: PHYSICAL THERAPIST

## 2020-07-23 PROCEDURE — 97140 MANUAL THERAPY 1/> REGIONS: CPT | Mod: GP | Performed by: PHYSICAL THERAPIST

## 2020-07-27 ENCOUNTER — THERAPY VISIT (OUTPATIENT)
Dept: PHYSICAL THERAPY | Facility: CLINIC | Age: 30
End: 2020-07-27
Payer: COMMERCIAL

## 2020-07-27 DIAGNOSIS — G54.0 TOS (THORACIC OUTLET SYNDROME): ICD-10-CM

## 2020-07-27 PROCEDURE — 97140 MANUAL THERAPY 1/> REGIONS: CPT | Mod: GP | Performed by: PHYSICAL THERAPIST

## 2020-07-27 PROCEDURE — 97112 NEUROMUSCULAR REEDUCATION: CPT | Mod: GP | Performed by: PHYSICAL THERAPIST

## 2020-07-30 ENCOUNTER — THERAPY VISIT (OUTPATIENT)
Dept: PHYSICAL THERAPY | Facility: CLINIC | Age: 30
End: 2020-07-30
Payer: COMMERCIAL

## 2020-07-30 DIAGNOSIS — G54.0 TOS (THORACIC OUTLET SYNDROME): ICD-10-CM

## 2020-07-30 PROCEDURE — 97140 MANUAL THERAPY 1/> REGIONS: CPT | Mod: GP | Performed by: PHYSICAL THERAPIST

## 2020-07-30 PROCEDURE — 97110 THERAPEUTIC EXERCISES: CPT | Mod: GP | Performed by: PHYSICAL THERAPIST

## 2020-08-04 ENCOUNTER — THERAPY VISIT (OUTPATIENT)
Dept: PHYSICAL THERAPY | Facility: CLINIC | Age: 30
End: 2020-08-04
Payer: COMMERCIAL

## 2020-08-04 DIAGNOSIS — G54.0 TOS (THORACIC OUTLET SYNDROME): ICD-10-CM

## 2020-08-04 PROCEDURE — 97140 MANUAL THERAPY 1/> REGIONS: CPT | Mod: GP | Performed by: PHYSICAL THERAPIST

## 2020-08-04 PROCEDURE — 97112 NEUROMUSCULAR REEDUCATION: CPT | Mod: GP | Performed by: PHYSICAL THERAPIST

## 2020-08-07 ENCOUNTER — THERAPY VISIT (OUTPATIENT)
Dept: PHYSICAL THERAPY | Facility: CLINIC | Age: 30
End: 2020-08-07
Payer: COMMERCIAL

## 2020-08-07 DIAGNOSIS — G54.0 TOS (THORACIC OUTLET SYNDROME): ICD-10-CM

## 2020-08-07 PROCEDURE — 97140 MANUAL THERAPY 1/> REGIONS: CPT | Mod: GP | Performed by: PHYSICAL THERAPIST

## 2020-08-07 PROCEDURE — 97110 THERAPEUTIC EXERCISES: CPT | Mod: GP | Performed by: PHYSICAL THERAPIST

## 2020-08-12 ENCOUNTER — THERAPY VISIT (OUTPATIENT)
Dept: PHYSICAL THERAPY | Facility: CLINIC | Age: 30
End: 2020-08-12
Payer: COMMERCIAL

## 2020-08-12 DIAGNOSIS — G54.0 TOS (THORACIC OUTLET SYNDROME): ICD-10-CM

## 2020-08-12 PROCEDURE — 97140 MANUAL THERAPY 1/> REGIONS: CPT | Mod: GP | Performed by: PHYSICAL THERAPIST

## 2020-08-14 ENCOUNTER — THERAPY VISIT (OUTPATIENT)
Dept: PHYSICAL THERAPY | Facility: CLINIC | Age: 30
End: 2020-08-14
Payer: COMMERCIAL

## 2020-08-14 DIAGNOSIS — G54.0 TOS (THORACIC OUTLET SYNDROME): ICD-10-CM

## 2020-08-14 PROCEDURE — 97140 MANUAL THERAPY 1/> REGIONS: CPT | Mod: GP | Performed by: PHYSICAL THERAPIST

## 2020-08-14 PROCEDURE — 97112 NEUROMUSCULAR REEDUCATION: CPT | Mod: GP | Performed by: PHYSICAL THERAPIST

## 2020-08-19 ENCOUNTER — THERAPY VISIT (OUTPATIENT)
Dept: PHYSICAL THERAPY | Facility: CLINIC | Age: 30
End: 2020-08-19
Payer: COMMERCIAL

## 2020-08-19 DIAGNOSIS — G54.0 TOS (THORACIC OUTLET SYNDROME): ICD-10-CM

## 2020-08-19 PROCEDURE — 97112 NEUROMUSCULAR REEDUCATION: CPT | Mod: GP | Performed by: PHYSICAL THERAPIST

## 2020-08-19 PROCEDURE — 97140 MANUAL THERAPY 1/> REGIONS: CPT | Mod: GP | Performed by: PHYSICAL THERAPIST

## 2020-08-21 ENCOUNTER — THERAPY VISIT (OUTPATIENT)
Dept: PHYSICAL THERAPY | Facility: CLINIC | Age: 30
End: 2020-08-21
Payer: COMMERCIAL

## 2020-08-21 DIAGNOSIS — G54.0 TOS (THORACIC OUTLET SYNDROME): ICD-10-CM

## 2020-08-21 PROCEDURE — 97140 MANUAL THERAPY 1/> REGIONS: CPT | Mod: GP | Performed by: PHYSICAL THERAPIST

## 2020-08-26 ENCOUNTER — THERAPY VISIT (OUTPATIENT)
Dept: PHYSICAL THERAPY | Facility: CLINIC | Age: 30
End: 2020-08-26
Payer: COMMERCIAL

## 2020-08-26 DIAGNOSIS — G54.0 TOS (THORACIC OUTLET SYNDROME): ICD-10-CM

## 2020-08-26 PROCEDURE — 97110 THERAPEUTIC EXERCISES: CPT | Mod: GP | Performed by: PHYSICAL THERAPIST

## 2020-08-26 PROCEDURE — 97530 THERAPEUTIC ACTIVITIES: CPT | Mod: GP | Performed by: PHYSICAL THERAPIST

## 2020-08-26 NOTE — PROGRESS NOTES
Subjective:  Hx of stress fracture, left 2nd MT? Booted 9 weeks-stopped exercising   PT to date has been successful.  Referral source (MD, PT, DC) PT Khurram Yousif TOS  DOI: 17  Tree branch feel on head, insidious onset 3 months-typically does exercise and became inactive  Type of bike: trek hybrid-  How long have you been riding this bike: recreation-haven't done in a while-migraine ended in may tried hiking and nkzqcw-31-59 minutes and visual changes  Have you had a previous bikefit: none  Any recent changes to your bike none  Type of pedals: ( flats)  Weekly miles bikin-60 minutes 2 times a week  Do you ride year round:( no)  How would you describe the type of rider you are (recreational rider)   Pt has a ginette reformer  Symptomology:   Do you currently have pain ( yes) right pressure eye and blurred vision, sxs in hand thumb in ring finger/middle finger   How long have you had the pain 3 years   Frequency of pain constant  Description of pain see above aggravate with exercise and sleeping with too flat of a pillow  Where is the pain see above    How do you provoke the pain with riding(duration)     Objective:    Off the Bike measures, as indicated    Flexibility Screen:    Right Left        pec + +   Hamstring     Hip Flexor     ITB/Lat Hip in SL     Quadriceps     Gastroc/ Soleus     - = normal  + = mild tightness  ++ = moderate tightness  +++ = significant tightness      Static bike measures measurements:    Seat Level: measure off  if possible Initial ( -6.7degrees) Post  ( -2.4degrees)               Knee left right   Knee flex angle (seat height) 27 degrees/mm  new knee angle 33 27degrees/mm  new knee angle 33     Seat Position  Seat Fore/aft (good positioning)       Trunk angle 56   Shoulder angle (humerus, T1-7 with axis at GH, goal around 90) 80   Shoulder width (Good width, but limited ) straight bars with ergo  but tilted too far down      left right   Elbow (flexion angle) 0  degrees 0degrees   Wrist position: (ext) ( ext)       Dynamic Assessment:    Anterior view:  Knee position/pedal stroke: left right    Top(neutral) bottom( neutral, ) Top(neutral) bottom( neutral)     Lateral view:  Trunk Position (good position)   Reach (good position)         Clinical assessment and changes:    Pt presents with increased pressure in the cockpit region.  Seat position was lowered and padded seat was removed.  This created a more optimal weight distribution and seat height.  Seat angle adjusted to decr ant weight thru cockpit.  Discussion on adding bar ends for additional  changes.  Pt  were also rotated to allow for a more neutral wrist position.  Pt ed on chin tucks cervical SB and cat/cow every 3-5 minutes on the bike ride and unlocking the elbows.

## 2020-09-01 ENCOUNTER — THERAPY VISIT (OUTPATIENT)
Dept: PHYSICAL THERAPY | Facility: CLINIC | Age: 30
End: 2020-09-01
Payer: COMMERCIAL

## 2020-09-01 DIAGNOSIS — G54.0 TOS (THORACIC OUTLET SYNDROME): ICD-10-CM

## 2020-09-01 PROCEDURE — 97140 MANUAL THERAPY 1/> REGIONS: CPT | Mod: GP | Performed by: PHYSICAL THERAPIST

## 2020-09-11 ENCOUNTER — THERAPY VISIT (OUTPATIENT)
Dept: PHYSICAL THERAPY | Facility: CLINIC | Age: 30
End: 2020-09-11
Payer: COMMERCIAL

## 2020-09-11 DIAGNOSIS — G54.0 TOS (THORACIC OUTLET SYNDROME): ICD-10-CM

## 2020-09-11 PROCEDURE — 97140 MANUAL THERAPY 1/> REGIONS: CPT | Mod: GP | Performed by: PHYSICAL THERAPIST

## 2020-09-11 PROCEDURE — 97110 THERAPEUTIC EXERCISES: CPT | Mod: GP | Performed by: PHYSICAL THERAPIST

## 2020-09-16 ENCOUNTER — THERAPY VISIT (OUTPATIENT)
Dept: PHYSICAL THERAPY | Facility: CLINIC | Age: 30
End: 2020-09-16
Payer: COMMERCIAL

## 2020-09-16 DIAGNOSIS — G54.0 TOS (THORACIC OUTLET SYNDROME): ICD-10-CM

## 2020-09-16 PROCEDURE — 97140 MANUAL THERAPY 1/> REGIONS: CPT | Mod: GP | Performed by: PHYSICAL THERAPIST

## 2020-09-16 PROCEDURE — 97110 THERAPEUTIC EXERCISES: CPT | Mod: GP | Performed by: PHYSICAL THERAPIST

## 2020-09-25 ENCOUNTER — THERAPY VISIT (OUTPATIENT)
Dept: PHYSICAL THERAPY | Facility: CLINIC | Age: 30
End: 2020-09-25
Payer: COMMERCIAL

## 2020-09-25 DIAGNOSIS — G54.0 TOS (THORACIC OUTLET SYNDROME): ICD-10-CM

## 2020-09-25 PROCEDURE — 97140 MANUAL THERAPY 1/> REGIONS: CPT | Mod: GP | Performed by: PHYSICAL THERAPIST

## 2020-09-25 PROCEDURE — 97110 THERAPEUTIC EXERCISES: CPT | Mod: GP | Performed by: PHYSICAL THERAPIST

## 2020-09-29 NOTE — PROGRESS NOTES
"Melia Braxton is a 29 year old female who is being evaluated via a billable video visit.      The patient has been notified of following:     \"This video visit will be conducted via a call between you and your physician/provider. We have found that certain health care needs can be provided without the need for an in-person physical exam.  This service lets us provide the care you need with a video conversation.  If a prescription is necessary we can send it directly to your pharmacy.  If lab work is needed we can place an order for that and you can then stop by our lab to have the test done at a later time.    Video visits are billed at different rates depending on your insurance coverage.  Please reach out to your insurance provider with any questions.    If during the course of the call the physician/provider feels a video visit is not appropriate, you will not be charged for this service.\"    Patient has given verbal consent for Video visit? Yes  How would you like to obtain your AVS? MyChart  If you are dropped from the video visit, the video invite should be resent to: Send to e-mail at: [email or web address]  Will anyone else be joining your video visit? No    Video-Visit Details  Type of service:  Video Visit  Video Start Time: 4:30PM  Video End Time: 5:00PM  Originating Location (pt. Location): Home  Distant Location (provider location):  [facility] NEUROLOGY   Platform used for Video Visit: Andrea ESTEBAN Cha, MD    Follow-up 6-19-20  Patient is a 29-year-old woman with a past history of having a tree fall on the right side of her head and body who had experienced 3 episodes of severe right sided headache along with right eye visual loss starting in March of this year.  She is following up from our visit on 6-19-20 after being diagnosed with right sided venous thoracic outlet syndrome because of concurrent right-sided neck pain and paresthesias in the right hand.     Her ultrasound TOS protocol from " "6-03-20 showed complete obstruction of right subclavian venous flow in the 135 and 180 degree abduction positions.  She had a tripling of velocity from the neutral to the 90 degree position which typically indicates about a 75% luminal narrowing.  There was milder evidence of arterial TOS on the same side.  Left-sided subclavian velocities also tripled between 90 degrees and 135 degrees.  CT scan of the chest showed no externally obstructive lesions but mild narrowing of the right subclavian vein.    She has undergone extensive physical therapy for TOS which has greatly improved the paresthesias in her arms as well the severe pain in the right eye. She does continue to experience the right eye pressure with physical activity, however but the flashes of light in the periphery have resolved.  A trial of furosemide could not be tolerated because it made her headaches worse.      She walks her dog twice a day.  She bikes once or twice a week for no more than 30-minutes.     She had a consultation with Dr. Dimas Moreno at [facility] on 7-20-20 who concurred on the diagnosis of TOS and discussed the possibility of a 1st rib resection and scalenectomy after a trial of physical therapy.  She had a scalene block trial this morning that has not changed her headaches or her residual arm symptoms.  After the scalene block, she could still bring on the right eye pressure with physical activity.     Other descriptions of her headache from her notes to us include:   \"When I leave PT, the right eye socket area is particularly sensitive, almost like there is  Icy Hot  cream applied to the inside of the area. During PT, when the therapist places sustained pressure down on my right trapezius, the right eyebrow pinpoint area becomes acute. It's my understanding that this is related to the first rib being released.\"     \"Physical activity continues to exacerbate pain in my right eyebrow pinpoint area and right eye " "socket area. Going for a bike ride, doing pilates, or walking quickly at a consistent pace all cause my head symptoms to flare up. Starting around 25 minutes of sustained activity, vision gets blurry in my right eye and eventually gets to the point of not being able to clearly make out letters and numbers (from up close and from a distance). When I am not active and just sitting still, if I play around with my breathing and take several deep breaths in a row, the right eyebrow pain becomes acute.\"    MEDS:  Current Outpatient Medications:      methylphenidate (RITALIN) 10 MG tablet, Take 1 tablet by mouth daily, Disp: , Rfl:      ALBUTEROL INHALER 17GM, This product no longer available, Disp: , Rfl:      furosemide (LASIX) 20 MG tablet, Take 1 tablet (20 mg) by mouth 2 times daily (Patient not taking: Reported on 7/20/2020), Disp: 60 tablet, Rfl: 3     Levalbuterol Tartrate (XOPENEX HFA IN), , Disp: , Rfl:      levonorgestrel (MIRENA) 20 MCG/24HR IUD, 1 each by Intrauterine route once, Disp: , Rfl:      paragard intrauterine copper, 1 each by Intrauterine route once, Disp: , Rfl:     Impression:  29 year old woman with right sided venous and milder arterial TOS with prominent right eye pressure induced by physical exertion.  Arm symptoms have improved significantly with physical therapy but eye pressure has not.  Scalene block has not changed the eye pressure.  We discussed the possibility of a concurrent right internal jugular vein stenosis as a contributor to the eye pain, which may represent congestion of the cavernous sinus.     Plan:   1. Ultrasound TOS jugular vein and follow-up with CT venogram if abnormal  2. Trial amlodipine for visual symptoms    30-minutes were spent in evaluation, examination, and counseling in real time with more than 50% spent in counseling and coordination of care.  After a review of the patient s situation, this visit was changed from an in-person visit to a video visit to reduce the " risk of COVID-19 exposure.  The patient is being evaluated via a billable video visit.

## 2020-09-30 ENCOUNTER — VIRTUAL VISIT (OUTPATIENT)
Dept: NEUROLOGY | Facility: CLINIC | Age: 30
End: 2020-09-30
Payer: COMMERCIAL

## 2020-09-30 DIAGNOSIS — I87.1 COMPRESSION OF VEIN: ICD-10-CM

## 2020-09-30 DIAGNOSIS — H53.9 VISUAL AURA: ICD-10-CM

## 2020-09-30 DIAGNOSIS — G54.0 THORACIC OUTLET SYNDROME: Primary | ICD-10-CM

## 2020-09-30 DIAGNOSIS — G54.0 TOS (THORACIC OUTLET SYNDROME): ICD-10-CM

## 2020-09-30 RX ORDER — METHYLPHENIDATE HYDROCHLORIDE 10 MG/1
1 TABLET ORAL DAILY
COMMUNITY
Start: 2020-04-22

## 2020-09-30 RX ORDER — AMLODIPINE BESYLATE 2.5 MG/1
2.5 TABLET ORAL DAILY
Qty: 30 TABLET | Refills: 3 | Status: SHIPPED | OUTPATIENT
Start: 2020-09-30 | End: 2021-04-28

## 2020-09-30 NOTE — PROGRESS NOTES
"Melia Braxton is a 29 year old female who is being evaluated via a billable video visit.      The patient has been notified of following:     \"This video visit will be conducted via a call between you and your physician/provider. We have found that certain health care needs can be provided without the need for an in-person physical exam.  This service lets us provide the care you need with a video conversation.  If a prescription is necessary we can send it directly to your pharmacy.  If lab work is needed we can place an order for that and you can then stop by our lab to have the test done at a later time.    Video visits are billed at different rates depending on your insurance coverage.  Please reach out to your insurance provider with any questions.    If during the course of the call the physician/provider feels a video visit is not appropriate, you will not be charged for this service.\"    Patient has given verbal consent for Video visit?YES  How would you like to obtain your AVS? Mychart  {If patient encounters technical issues they should call [phone] :[id number]}      Video-Visit Details    Type of service:  Video Visit    Video Start Time:   Video End Time:     Originating Location (pt. Location): Home    Distant Location (provider location):  [facility] NEUROLOGY     Platform used for Video Visit: Andrea Chatman, EMT        "

## 2020-09-30 NOTE — LETTER
"9/30/2020       RE: Melia Braxton  [address]  [facility] [address]     Dear Colleague,    Thank you for referring your patient, Melia Braxton, to the [facility] NEUROLOGY at [facility]. Please see a copy of my visit note below.    Melia Braxton is a 29 year old female who is being evaluated via a billable video visit.      The patient has been notified of following:     \"This video visit will be conducted via a call between you and your physician/provider. We have found that certain health care needs can be provided without the need for an in-person physical exam.  This service lets us provide the care you need with a video conversation.  If a prescription is necessary we can send it directly to your pharmacy.  If lab work is needed we can place an order for that and you can then stop by our lab to have the test done at a later time.    Video visits are billed at different rates depending on your insurance coverage.  Please reach out to your insurance provider with any questions.    If during the course of the call the physician/provider feels a video visit is not appropriate, you will not be charged for this service.\"    Patient has given verbal consent for Video visit? Yes  How would you like to obtain your AVS? MyChart  If you are dropped from the video visit, the video invite should be resent to: Send to e-mail at: [email or web address]  Will anyone else be joining your video visit? No    Video-Visit Details  Type of service:  Video Visit  Video Start Time: 4:30PM  Video End Time: 5:00PM  Originating Location (pt. Location): Home  Distant Location (provider location):  [facility] NEUROLOGY   Platform used for Video Visit: Andrea ESTEBAN Cha, MD    Follow-up 6-19-20  Patient is a 29-year-old woman with a past history of having a tree fall on the right side of her head and body who had experienced 3 episodes of severe right sided headache along with right " "eye visual loss starting in March of this year.  She is following up from our visit on 6-19-20 after being diagnosed with right sided venous thoracic outlet syndrome because of concurrent right-sided neck pain and paresthesias in the right hand.     Her ultrasound TOS protocol from 6-03-20 showed complete obstruction of right subclavian venous flow in the 135 and 180 degree abduction positions.  She had a tripling of velocity from the neutral to the 90 degree position which typically indicates about a 75% luminal narrowing.  There was milder evidence of arterial TOS on the same side.  Left-sided subclavian velocities also tripled between 90 degrees and 135 degrees.  CT scan of the chest showed no externally obstructive lesions but mild narrowing of the right subclavian vein.    She has undergone extensive physical therapy for TOS which has greatly improved the paresthesias in her arms as well the severe pain in the right eye. She does continue to experience the right eye pressure with physical activity, however but the flashes of light in the periphery have resolved.  A trial of furosemide could not be tolerated because it made her headaches worse.      She walks her dog twice a day.  She bikes once or twice a week for no more than 30-minutes.     She had a consultation with Dr. Dimas Moreno at [facility] on 7-20-20 who concurred on the diagnosis of TOS and discussed the possibility of a 1st rib resection and scalenectomy after a trial of physical therapy.  She had a scalene block trial this morning that has not changed her headaches or her residual arm symptoms.  After the scalene block, she could still bring on the right eye pressure with physical activity.     Other descriptions of her headache from her notes to us include:   \"When I leave PT, the right eye socket area is particularly sensitive, almost like there is  Icy Hot  cream applied to the inside of the area. During PT, when the therapist " "places sustained pressure down on my right trapezius, the right eyebrow pinpoint area becomes acute. It's my understanding that this is related to the first rib being released.\"     \"Physical activity continues to exacerbate pain in my right eyebrow pinpoint area and right eye socket area. Going for a bike ride, doing pilates, or walking quickly at a consistent pace all cause my head symptoms to flare up. Starting around 25 minutes of sustained activity, vision gets blurry in my right eye and eventually gets to the point of not being able to clearly make out letters and numbers (from up close and from a distance). When I am not active and just sitting still, if I play around with my breathing and take several deep breaths in a row, the right eyebrow pain becomes acute.\"    MEDS:  Current Outpatient Medications:      methylphenidate (RITALIN) 10 MG tablet, Take 1 tablet by mouth daily, Disp: , Rfl:      ALBUTEROL INHALER 17GM, This product no longer available, Disp: , Rfl:      furosemide (LASIX) 20 MG tablet, Take 1 tablet (20 mg) by mouth 2 times daily (Patient not taking: Reported on 7/20/2020), Disp: 60 tablet, Rfl: 3     Levalbuterol Tartrate (XOPENEX HFA IN), , Disp: , Rfl:      levonorgestrel (MIRENA) 20 MCG/24HR IUD, 1 each by Intrauterine route once, Disp: , Rfl:      paragard intrauterine copper, 1 each by Intrauterine route once, Disp: , Rfl:     Impression:  29 year old woman with right sided venous and milder arterial TOS with prominent right eye pressure induced by physical exertion.  Arm symptoms have improved significantly with physical therapy but eye pressure has not.  Scalene block has not changed the eye pressure.  We discussed the possibility of a concurrent right internal jugular vein stenosis as a contributor to the eye pain, which may represent congestion of the cavernous sinus.     Plan:   1. Ultrasound TOS jugular vein and follow-up with CT venogram if abnormal  2. Trial amlodipine for " visual symptoms    30-minutes were spent in evaluation, examination, and counseling in real time with more than 50% spent in counseling and coordination of care.  After a review of the patient s situation, this visit was changed from an in-person visit to a video visit to reduce the risk of COVID-19 exposure.  The patient is being evaluated via a billable video visit.        Again, thank you for allowing me to participate in the care of your patient.      Sincerely,    Ernesto ESTEBAN Cha, MD

## 2020-09-30 NOTE — LETTER
Date:October 5, 2020      Patient was self referred, no letter generated. Do not send.        [facility] Information

## 2020-10-01 ENCOUNTER — TELEPHONE (OUTPATIENT)
Dept: NEUROLOGY | Facility: CLINIC | Age: 30
End: 2020-10-01

## 2020-10-01 NOTE — TELEPHONE ENCOUNTER
Patient called tonight with severe anxiety about her pending ultrasound and work up with Dr. Fallon. She was reading online and read that her eye pressure could be from a brain aneurysm and is quite anxious. She wonders if she can take Atarax that she has at home. Reviewed clinic notes and discussed Dr. Fallon's plan with the patient, reassuring her that she does not need to worry about a brain aneurysm at this time and there are other reasons for her eye pressure symptoms, which are being evaluated. Atarax is reasonable, provided reassurance.     Leidy Hinds DO

## 2020-10-02 ENCOUNTER — ANCILLARY PROCEDURE (OUTPATIENT)
Dept: ULTRASOUND IMAGING | Facility: CLINIC | Age: 30
End: 2020-10-02
Attending: PSYCHIATRY & NEUROLOGY
Payer: COMMERCIAL

## 2020-10-02 ENCOUNTER — THERAPY VISIT (OUTPATIENT)
Dept: PHYSICAL THERAPY | Facility: CLINIC | Age: 30
End: 2020-10-02
Payer: COMMERCIAL

## 2020-10-02 DIAGNOSIS — I87.1 COMPRESSION OF VEIN: ICD-10-CM

## 2020-10-02 DIAGNOSIS — G54.0 TOS (THORACIC OUTLET SYNDROME): ICD-10-CM

## 2020-10-02 PROCEDURE — 97530 THERAPEUTIC ACTIVITIES: CPT | Mod: GP | Performed by: PHYSICAL THERAPIST

## 2020-10-02 PROCEDURE — 97140 MANUAL THERAPY 1/> REGIONS: CPT | Mod: GP | Performed by: PHYSICAL THERAPIST

## 2020-10-02 PROCEDURE — 93970 EXTREMITY STUDY: CPT

## 2020-10-02 PROCEDURE — 93922 UPR/L XTREMITY ART 2 LEVELS: CPT

## 2020-10-05 ENCOUNTER — ANCILLARY PROCEDURE (OUTPATIENT)
Dept: CT IMAGING | Facility: CLINIC | Age: 30
End: 2020-10-05
Attending: PSYCHIATRY & NEUROLOGY
Payer: COMMERCIAL

## 2020-10-05 DIAGNOSIS — I87.1 COMPRESSION OF VEIN: ICD-10-CM

## 2020-10-05 DIAGNOSIS — I87.1 COMPRESSION OF VEIN: Primary | ICD-10-CM

## 2020-10-05 PROCEDURE — 70496 CT ANGIOGRAPHY HEAD: CPT | Mod: GC | Performed by: RADIOLOGY

## 2020-10-05 PROCEDURE — 70498 CT ANGIOGRAPHY NECK: CPT | Mod: GC | Performed by: RADIOLOGY

## 2020-10-05 RX ORDER — IOPAMIDOL 755 MG/ML
75 INJECTION, SOLUTION INTRAVASCULAR ONCE
Status: COMPLETED | OUTPATIENT
Start: 2020-10-05 | End: 2020-10-05

## 2020-10-05 RX ADMIN — IOPAMIDOL 75 ML: 755 INJECTION, SOLUTION INTRAVASCULAR at 14:39

## 2020-10-08 ENCOUNTER — TELEPHONE (OUTPATIENT)
Dept: NEUROLOGY | Facility: CLINIC | Age: 30
End: 2020-10-08

## 2020-10-08 DIAGNOSIS — H57.11 EYE PAIN, RIGHT: Primary | ICD-10-CM

## 2020-10-08 NOTE — TELEPHONE ENCOUNTER
I called pt and left her a message with recommendations Dr. Fallon. Dr. Fallon has ordered an MRI of the orbits. She would like her to set up that visit by calling imaging schedulers at [phone]. If she can get MRI done prior to 10/14 we can add her on as a video visit at 10:30am on 10/14 with Dr. Fallon. I asked pt to call back once she sets up her MRI.        Ivonne BEAVER

## 2020-10-08 NOTE — TELEPHONE ENCOUNTER
[facility] Call Center    Phone Message    May a detailed message be left on voicemail: yes     Reason for Call: Other: Please have Nurse Ivonne COHEN call this pt. Thank you     Action Taken: Message routed to:  [facility] ([facility]  Neurology    Travel Screening: Not Applicable

## 2020-10-08 NOTE — TELEPHONE ENCOUNTER
I called pt to discuss her [employer] message. I left a voicemail I was forwarding her message to Dr. Fallon to review. I will give her an update once I hear back with Dr. Fallon's recommendations. We will schedule her on 10/28 and add her to wait list in case something opens up sooner.     Ivonne BEAVER

## 2020-10-08 NOTE — TELEPHONE ENCOUNTER
"I returned pt call to discuss her symptoms. She let me know she has had these \"head symptoms\" for 8 months. She is very anxious about her symptoms and is worried she has an unruptured aneurysm. She has a constant pressure to her right eye socket and pinpoint pressure to her right eyebrow area. She says the pain is always here but is worse with exercise.     I reassured her if Dr. Fallon felt her symptoms were emergent we would let her know asap. I will update Dr. Fallon on our conversation. Patient is wondering about starting an anxiety medication to help her cope. I let her know we usually have patients talk with their primary care regarding anxiety meds. She is wondering if Dr. Fallon has a recommendation for a medication or if there is a medication she should not take with her current symptoms/medications.     I will update pt when I hear back from Dr. Fallon.     Ivonne BEAVER  "

## 2020-10-12 NOTE — PROGRESS NOTES
"Melia Braxton is a 29 year old female who is being evaluated via a billable video visit.      The patient has been notified of following:     \"This video visit will be conducted via a call between you and your physician/provider. We have found that certain health care needs can be provided without the need for an in-person physical exam.  This service lets us provide the care you need with a video conversation.  If a prescription is necessary we can send it directly to your pharmacy.  If lab work is needed we can place an order for that and you can then stop by our lab to have the test done at a later time.    Video visits are billed at different rates depending on your insurance coverage.  Please reach out to your insurance provider with any questions.    If during the course of the call the physician/provider feels a video visit is not appropriate, you will not be charged for this service.\"    Patient has given verbal consent for Video visit? yes  How would you like to obtain your AVS? mychart  If you are dropped from the video visit, the video invite should be resent to: cell  Will anyone else be joining your video visit? no      Video-Visit Details  Type of service:  Video Visit  Video Start Time: 10:30AM  Video End Time: 11:00AM  Originating Location (pt. Location): Home  Distant Location (provider location):  [facility]UROLOG[facility] [place]   Platform used for Video Visit: [facility]    Follow-up 9-30-20  Patient is a 29-year-old woman with a past history of having a tree fall on the right side of her head and body who had experienced 3 episodes of severe right sided headache along with right eye visual loss starting in March of this year.  She is following up from our visit on 9-30-20 after being diagnosed with right sided venous thoracic outlet syndrome because of concurrent right-sided neck pain and paresthesias in the right hand. She has also visited with Dr. Dimas Moreno at the [facility] for consideration of decompressive surgery     Her ultrasound TOS protocol from 6-03-20 showed complete obstruction of right subclavian venous flow in the 135 and 180 degree abduction positions.  She had a tripling of velocity from the neutral to the 90 degree position which typically indicates about a 75% luminal narrowing.  There was milder evidence of arterial TOS on the same side.  Left-sided subclavian velocities also tripled between 90 degrees and 135 degrees.  CT scan of the chest showed no externally obstructive lesions but mild narrowing of the right subclavian vein.     She has undergone extensive physical therapy for TOS which has greatly improved the paresthesias in her arms as well the severe pain in the right eye. She does continue to experience the right eye pressure with physical activity, however but the flashes of light in the periphery have resolved.  A trial of furosemide could not be tolerated because it made her headaches worse.      In the interim, she has undergone a repeat US of the thoracic outlets and internal jugular veins.  The ultrasound showed that all veins were patent but the flow in the right internal jugular vein was 26 cm/s versus 110 cm/s in the left internal jugular vein.  A CT venogram of the head and neck did not show any stenosis of the internal jugular veins, however.    The ultrasound did show that the flow in bilateral subclavian veins did return consistent with her improved arm paresthesias although there was still a tripling of velocity within the right subclavian vein with arm elevation from the neutral position to 135 degrees as well as a significant decrease in velocity from the medial subclavian vein to the mid subclavian vein (100 cm/s to 22 cm/s).  We investigated an MRI of the orbits to be sure that there were no orbital issues contributing to her visual loss.  An MRI of the brain and orbits with contrast on 10-13-20 showed no lesions.    She still  has constant pain over the trapezius on the right side as well as the right side of the neck.  She is not able to paint because it aggravates the pain. The pain behind the right eyebrow is persistent..  When the PT pushes down on the trapezius, the right eye develops pain and pressure..  The vision gets blurry when using her arms a lot. She has not started the amlodipine, yet.    Impression: Based on the persistent abduction related increase in velocity across the right subclavian vein and the severe drop off in velocity from the medial to midportion as well as the report of eye symptoms that are worsened by pressure on the trapezius and the normal CT venogram of the internal jugular veins and MRI of the orbits it seems that her problem still localized to the thoracic outlet on the right side.    Plan:   1. Trial amlodipine 2.5mg and increase to 5mg after 1 week for addressing any migraine component  2. Confer with Dr. Lindquist about possible venogram and venoplasty and consideration of 1st rib removal    DATA:  THORACIC INLET/OUTLET DUPLEX ULTRASOUND 10/2/2020     CLINICAL HISTORY: Thoracic outlet syndrome with right eye pressure and  blurring vision after trauma.     COMPARISONS: 6/3/2020     REFERRING PROVIDER: KRUPA RODRIGUEZ CHA     TECHNIQUE: Bilateral innominate, subclavian, and axillary veins were  evaluated grayscale, color Doppler, Doppler waveform ultrasound.  Bilateral subclavian veins were evaluated with Doppler waveform  imaging through abduction maneuvers.     Bilateral index finger PPG's obtained at rest and with provocative  positions.     FINDINGS:  RIGHT:       REST:            INTERNAL JUGULAR VEIN: 70 cm/s, phasic            INNOMINATE VEIN: 52 cm/s, phasic            SUBCLAVIAN VEIN, medial: 100 cm/s, phasic            SUBCLAVIAN VEIN, mid: 22 cm/s, phasic, fully compressible            SUBCLAVIAN VEIN, lateral: 18 cm/s, phasic, fully  compressible            AXILLARY VEIN: 23 cm/s, phasic, fully  compressible          MID SUBCLAVIAN VEIN, sitting erect:            0 degrees: 24 cm/s, phasic            90 degrees: 59 cm/s, phasic            135 degrees: 93 cm/s, phasic            180 degrees: 44 cm/s, phasic          INTERNAL JUGULAR VEIN, sitting erect:            Upright: 26 cm/s, phasic            Head to right, eyes up to right side: 95 cm/s, phasic            Head back: 52 cm/s, phasic            Head to right: 42 cm/s         PPGs:            Baseline: Normal            Arms 90: Normal            Arms 180: Normal            : Normal             head right: Normal             head left: Normal            Onset: Normal     LEFT:       REST:            INTERNAL JUGULAR VEIN: 105 cm/s, phasic            INNOMINATE VEIN: 53 cm/s, phasic            SUBCLAVIAN VEIN, medial: 53 cm/s, phasic            SUBCLAVIAN VEIN, mid: 21 cm/s, phasic, fully compressible            SUBCLAVIAN VEIN, lateral: 27 cm/s, phasic, fully  compressible            AXILLARY VEIN: 44 cm/s, phasic, fully compressible          MID SUBCLAVIAN VEIN, sitting erect:            0 degrees: 79 cm/s, phasic            90 degrees: 67 cm/s, phasic            135 degrees: 132 cm/s, phasic            180 degrees: 121 cm/s, phasic          INTERNAL JUGULAR VEIN, sitting erect:            Upright: 110 cm/s, phasic            Head to right, eyes up: 122 cm/s, phasic            Head back: 54 cm/s, phasic            Head to right: 77 cm/s         PPGs:            Baseline: Normal            Arms 90: Normal            Arms 180: ABNORMAL            : Normal             head right: Normal             head left: Normal            Onset: Normal                                                                 IMPRESSION: Thoracic outlet/inlet physiology suggested. Correlate for  symptoms.     1. RIGHT:        A. More than quadrupling of velocity from the mid to medial  subclavian vein suggest venous stenosis as  rest.       B. Asymmetric internal jugular velocities, left more than  quadruple the right with the patient sitting upright, etiology not  demonstrated.       C. More than tripling of subclavian vein velocity at 135 degrees  suggests narrowing with maneuvers.       D. More than tripling of internal jugular vein velocity with head  to the right suggesting narrowing with maneuvers.     2. LEFT:       A. No subclavian venous stenosis suggested at rest.       B. Near doubling of velocity with maneuvers suggest a degree of  narrowing with maneuvers.       C. PPG becomes flat in Arms 180 position.     CTV HEAD NECK WITH CONTRAST 10/5/2020 2:47 PM     History:  29F with right IJ slow flow on ultrasound. Prior history of  head and and neck trauma. Right eye visual loss. Question right IJ  compression and location.; Compression of vein  ICD-10: Compression of vein     Comparison: 10/2/2020, 6/3/2020      Technique: Helically acquired thin section axial CT images were  obtained with 1 mm collimation through the brain and neck after  intravenous bolus injection of iodinated contrast medium with an  approximately 20-25 second delay between administration of contrast  and scanning. Image data were sent to the [employer] 3D workstation and  postprocessed by the radiologist using maximum intensity pixel (MIP),  multiplanar and volume rendered 3D reconstruction programs.      Contrast: Isovue 370 75cc     No evidence of intracranial hemorrhage. No mass effect or midline  shift. Ventricles do not appear enlarged out of proportion to the  cerebral sulci. Postcontrast images demonstrate no areas of abnormal  intraaxial or extraaxial contrast enhancement. The visualized  paranasal sinuses are clear. The mastoid air cells are clear.      Head CTV demonstrates no definite thrombosis or stenosis of the major  intracranial dural sinuses or deep cerebral veins. Postcontrast CTV  images do not demonstrate any definite intraluminal filling  defect.      The major vascular structures in the neck are patent and demonstrate  no intraluminal filling defects. Normal variant dominant right  internal jugular vein. Normal variant large right superior thyroid  vein. There is no evident cervical lymphadenopathy, no cervical soft  tissue masses.      Evaluation of the osseous structures demonstrate no worrisome lytic or  sclerotic lesion. No overt spinal canal or neuroforaminal stenosis.  The visualized paranasal sinuses are clear. The mastoid air cells are  clear. Mild air trapping in the lung apices.                                                                    Impression:  1.  No evidence for internal jugular vein compression.   2.  Normal variant dominant right internal jugular vein with normal  variant large right superior thyroid vein.  3.  Head CTV demonstrates patent major dural and deep venous sinuses  intracranially.     I have personally reviewed the examination and initial interpretation  and I agree with the findings.     DENZEL MATTA MD    30-minutes were spent in evaluation, examination, and counseling in real time with more than 50% spent in counseling and coordination of care.  After a review of the patient s situation, this visit was changed from an in-person visit to a video visit to reduce the risk of COVID-19 exposure.  The patient is being evaluated via a billable video visit.

## 2020-10-13 ENCOUNTER — HOSPITAL ENCOUNTER (OUTPATIENT)
Dept: MRI IMAGING | Facility: CLINIC | Age: 30
Discharge: HOME | End: 2020-10-13
Attending: PSYCHIATRY & NEUROLOGY | Admitting: PSYCHIATRY & NEUROLOGY
Payer: COMMERCIAL

## 2020-10-13 DIAGNOSIS — H57.11 EYE PAIN, RIGHT: ICD-10-CM

## 2020-10-13 PROCEDURE — 70543 MRI ORBT/FAC/NCK W/O &W/DYE: CPT

## 2020-10-13 PROCEDURE — 70543 MRI ORBT/FAC/NCK W/O &W/DYE: CPT | Mod: 26 | Performed by: RADIOLOGY

## 2020-10-13 PROCEDURE — A9585 GADOBUTROL INJECTION: HCPCS | Performed by: PSYCHIATRY & NEUROLOGY

## 2020-10-13 PROCEDURE — 70553 MRI BRAIN STEM W/O & W/DYE: CPT | Mod: 26 | Performed by: RADIOLOGY

## 2020-10-13 PROCEDURE — 255N000002 HC RX 255 OP 636: Performed by: PSYCHIATRY & NEUROLOGY

## 2020-10-13 RX ORDER — GADOBUTROL 604.72 MG/ML
7.5 INJECTION INTRAVENOUS ONCE
Status: COMPLETED | OUTPATIENT
Start: 2020-10-13 | End: 2020-10-13

## 2020-10-13 RX ADMIN — GADOBUTROL 7 ML: 604.72 INJECTION INTRAVENOUS at 14:57

## 2020-10-14 ENCOUNTER — VIRTUAL VISIT (OUTPATIENT)
Dept: NEUROLOGY | Facility: CLINIC | Age: 30
End: 2020-10-14
Payer: COMMERCIAL

## 2020-10-14 DIAGNOSIS — G54.0 THORACIC OUTLET SYNDROME: ICD-10-CM

## 2020-10-14 DIAGNOSIS — I87.1 COMPRESSION OF VEIN: Primary | ICD-10-CM

## 2020-10-14 DIAGNOSIS — H57.11 EYE PAIN, RIGHT: ICD-10-CM

## 2020-10-14 PROCEDURE — 99214 OFFICE O/P EST MOD 30 MIN: CPT | Mod: 95 | Performed by: PSYCHIATRY & NEUROLOGY

## 2020-11-10 ENCOUNTER — THERAPY VISIT (OUTPATIENT)
Dept: PHYSICAL THERAPY | Facility: CLINIC | Age: 30
End: 2020-11-10
Payer: COMMERCIAL

## 2020-11-10 DIAGNOSIS — G54.0 TOS (THORACIC OUTLET SYNDROME): ICD-10-CM

## 2020-11-10 PROCEDURE — 97530 THERAPEUTIC ACTIVITIES: CPT | Mod: GP | Performed by: PHYSICAL THERAPIST

## 2020-11-10 PROCEDURE — 97140 MANUAL THERAPY 1/> REGIONS: CPT | Mod: GP | Performed by: PHYSICAL THERAPIST

## 2020-11-10 PROCEDURE — 97110 THERAPEUTIC EXERCISES: CPT | Mod: GP | Performed by: PHYSICAL THERAPIST

## 2020-11-10 NOTE — PROGRESS NOTES
Discharge Note    Progress reporting period is from initial eval to Nov 10, 2020.     Melia failed to return for next follow up visit and current status is unknown.  Please see information below for last relevant information on current status.  Patient seen for   visits.    SUBJECTIVE  Reports that after further consultation, she is electing to have a venoplasty, scalenectomy, and rib resection procedure on 11/23/20. Her hand symptoms have resolved with PT, but her internal jugular vein on R has significant occlusion, likely causing her continued retro-orbital headache and blurred vision with activity. She will continue HEP until surgery and will resume outpatient PT per protocol following the procedure.   Changes in function:  Yes (See Goal flowsheet attached for changes in current functional level)  Adverse reaction to treatment or activity: None    OBJECTIVE  Continued pec minor, upper trap and scalene hypertonicity noted bilaterally.   Mid trap 4/5 MMT, low trap 4-/5.     ASSESSMENT/PLAN      DIAGP:  The encounter diagnosis was TOS (thoracic outlet syndrome).  Updated problem list and treatment plan:     Pain - HEP  Decreased ROM/flexibility - HEP  Decreased function - HEP  Decreased strength - HEP  Impaired muscle performance - HEP    STG/LTGs have been met or progress has been made towards goals:  Yes, please see goal flowsheet for most current information.    Assessment of Progress: current status is unknown.  Last current status:  .  Self Management Plans:  HEP    I have re-evaluated this patient and find that the nature, scope, duration and intensity of the therapy is appropriate for the medical condition of the patient.  Melia continues to require the following intervention to meet STG and LTG's:  HEP.    Recommendations:  Discharge with current home program.  Patient to follow up with MD as needed. Episode to be closed at this time and patient formally discharged from therapy.    Khurram Casey, PT, DPT,  OCS      Please refer to the daily flowsheet for treatment today, total treatment time and time spent performing 1:1 timed codes.

## 2020-12-29 ENCOUNTER — THERAPY VISIT (OUTPATIENT)
Dept: PHYSICAL THERAPY | Facility: CLINIC | Age: 30
End: 2020-12-29
Payer: COMMERCIAL

## 2020-12-29 DIAGNOSIS — M25.511 ACUTE PAIN OF RIGHT SHOULDER: ICD-10-CM

## 2020-12-29 DIAGNOSIS — Z47.89 AFTERCARE FOLLOWING SURGERY OF THE MUSCULOSKELETAL SYSTEM: ICD-10-CM

## 2020-12-29 DIAGNOSIS — M54.2 NECK PAIN: ICD-10-CM

## 2020-12-29 PROBLEM — M25.519 SHOULDER PAIN: Status: ACTIVE | Noted: 2020-12-29

## 2020-12-29 PROCEDURE — 97161 PT EVAL LOW COMPLEX 20 MIN: CPT | Mod: GP | Performed by: PHYSICAL THERAPIST

## 2020-12-29 PROCEDURE — 97110 THERAPEUTIC EXERCISES: CPT | Mod: GP | Performed by: PHYSICAL THERAPIST

## 2020-12-29 PROCEDURE — 97140 MANUAL THERAPY 1/> REGIONS: CPT | Mod: GP | Performed by: PHYSICAL THERAPIST

## 2020-12-29 NOTE — PROGRESS NOTES
Physical Therapy Initial Evaluation  December 29, 2020    Precautions/Restrictions/MD instructions: Post-surgical protocol for 1st rib removal/scalenectomy.  Date of Surgery: 11/23/20  Surgical Procedure/Limb: s/p R 1st rib removal and scalenectomy for TOS  Surgeon Name: Dr. Moreno  Average Daily Pain Levels: 6/10 (Location: R pectoral region; Quality: Aching/Throbbing)  Other Symptoms: Neck/shoulder stiffness  Symptom Mgmt Strategies: Rest, ice  Prior orthopaedic history/procedures: Refer to EMR  Prior non-operative management: PT  Next MD Appt Date: 1/6/21    Functional limitations following procedure: Unable to use R arm for self-cares, ADLs  Previous level of function: Unrestricted other than would get blurred vision if too active cardiovascularly (>20min)    Patient Employment: Operations    Post-Operative Physical Therapy Examination    Anthropometric Measures    Active ROM ROM (degrees)   Flexion 48   Extension 12    L R   Rotation 42 62   Sidebend 14 25       Joint ROM Right Left Difference   Flexion 90 deg 180 deg 90 deg   External Rotation at neutral 90 deg 90 deg 0 deg       Status of Incision: Clean & healing    - ULTT for ulnar/median/radial biases on R    Assessment/Plan:    The patient is a 30 year old female with chief complaint of R neck and shoulder pain.    The patient has the following significant findings with corresponding treatment plan.  Diagnosis 1:  s/p R 1st rib removal and scalenectomy for TOS    Pain -  hot/cold therapy, electric stimulation, manual therapy, splint/taping/bracing/orthotics and self management  Decreased ROM/flexibility - manual therapy, therapeutic exercise and home program  Decreased joint mobility - manual therapy and therapeutic exercise  Decreased strength - therapeutic exercise, therapeutic activities and home program  Impaired muscle performance - neuro re-education  Decreased function - therapeutic activities  Impaired posture - neuro re-education    Therapy  Evaluation Codes:   1) History comprised of:   Personal factors that impact the plan of care:      Please refer to EMR.    Comorbidity factors that impact the plan of care are:      Please refer to EMR.     Medications impacting care: None.  2) Examination of Body Systems comprised of:   Body structures and functions that impact the plan of care:      Shoulder.   Activity limitations that impact the plan of care are:      Bathing, Cooking, Driving, Dressing, Lifting, Reading/Computer work, Running, Sports, Walking, Working, Sleeping and Laying down.   Clinical presentation characteristics are:    Stable/Uncomplicated.  3) Presentation comprised of:   Presentation scored as Low complexity with uncomplicated characteristics..  4) Decision-Making    Low complexity using standardized patient assessment instrument and/or measureable assessment of functional outcome.  Cumulative Therapy Evaluation is: Low complexity.    Previous and current functional limitations:  (See Goal Flow Sheet for this information)    Short term and Long term goals: (See Goal Flow Sheet for this information)     Communication ability:  Patient appears to be able to clearly communicate and understand verbal and written communication and follow directions correctly.  Treatment Explanation - The following has been discussed with the patient: RX ordered/plan of care, anticipated outcomes, and possible risks and side effects.  This patient would benefit from PT intervention to resume normal activities.   Rehab potential is good.    Frequency:  1 X week, once daily  Duration:  for 4 weeks tapering to 2x/month for 2 months  Discharge Plan: Achieve all LTGs, be independent in home treatment program, and reach maximal therapeutic benefit.    Please refer to the daily flowsheet for treatment today, total treatment time and time spent performing 1:1 timed codes.

## 2020-12-29 NOTE — PROGRESS NOTES
[facility] Initial Evaluation  Subjective:    Patient Health History         Pain is reported as 6/10 on pain scale.  General health as reported by patient is good.  Pertinent medical history includes: asthma and migraines/headaches.        Surgeries: Appendectomy, removal of gallbladder.    Current medications:  Pain medication.    Current occupation is Operations.   Primary job tasks include:  Computer work and prolonged sitting.                                    Objective:  System    Physical Exam    General     ROS    Assessment/Plan:

## 2020-12-29 NOTE — LETTER
[facility]  [address] ([place] [address]  [phone]    2020    Re: Melia Braxton   :   1990  MRN:  [id number]   REFERRING PHYSICIAN:   Dimas Moreno    [facility]    Date of Initial Evaluation:  20  Visits:  Rxs Used: 1  Reason for Referral:     Aftercare following surgery of the musculoskeletal system  Neck pain  Acute pain of right shoulder    EVALUATION SUMMARY    Physical Therapy Initial Evaluation  2020    Precautions/Restrictions/MD instructions: Post-surgical protocol for 1st rib removal/scalenectomy.  Date of Surgery: 20  Surgical Procedure/Limb: s/p R 1st rib removal and scalenectomy for TOS  Surgeon Name: Dr. Moreno  Average Daily Pain Levels: 6/10 (Location: R pectoral region; Quality: Aching/Throbbing)  Other Symptoms: Neck/shoulder stiffness  Symptom Mgmt Strategies: Rest, ice  Prior orthopaedic history/procedures: Refer to EMR  Prior non-operative management: PT  Next MD Appt Date: 21    Functional limitations following procedure: Unable to use R arm for self-cares, ADLs  Previous level of function: Unrestricted other than would get blurred vision if too active cardiovascularly (>20min)    Patient Employment: Operations    Post-Operative Physical Therapy Examination    Anthropometric Measures    Active ROM ROM (degrees)   Flexion 48   Extension 12    L R   Rotation 42 62   Sidebend 14 25       Joint ROM Right Left Difference   Flexion 90 deg 180 deg 90 deg   External Rotation at neutral 90 deg 90 deg 0 deg       Status of Incision: Clean & healing    - ULTT for ulnar/median/radial biases on R    Assessment/Plan:    The patient is a 30 year old female with chief complaint of R neck and shoulder pain.    The patient has the following significant findings with corresponding treatment plan.  Diagnosis 1:  s/p R 1st rib removal and scalenectomy for TOS    Pain -   hot/cold therapy, electric stimulation, manual therapy, splint/taping/bracing/orthotics and self management  Decreased ROM/flexibility - manual therapy, therapeutic exercise and home program  Decreased joint mobility - manual therapy and therapeutic exercise  Decreased strength - therapeutic exercise, therapeutic activities and home program  Impaired muscle performance - neuro re-education  Decreased function - therapeutic activities  Impaired posture - neuro re-education    Therapy Evaluation Codes:   1) History comprised of:   Personal factors that impact the plan of care:      Please refer to EMR.    Comorbidity factors that impact the plan of care are:      Please refer to EMR.     Medications impacting care: None.  2) Examination of Body Systems comprised of:   Body structures and functions that impact the plan of care:      Shoulder.   Activity limitations that impact the plan of care are:      Bathing, Cooking, Driving, Dressing, Lifting, Reading/Computer work, Running, Sports, Walking, Working, Sleeping and Laying down.   Clinical presentation characteristics are:    Stable/Uncomplicated.  3) Presentation comprised of:   Presentation scored as Low complexity with uncomplicated characteristics..  4) Decision-Making    Low complexity using standardized patient assessment instrument and/or measureable assessment of functional outcome.  Cumulative Therapy Evaluation is: Low complexity.    Previous and current functional limitations:  (See Goal Flow Sheet for this information)    Short term and Long term goals: (See Goal Flow Sheet for this information)     Communication ability:  Patient appears to be able to clearly communicate and understand verbal and written communication and follow directions correctly.  Treatment Explanation - The following has been discussed with the patient: RX ordered/plan of care, anticipated outcomes, and possible risks and side effects.  This patient would benefit from PT  intervention to resume normal activities.   Rehab potential is good.    Frequency:  1 X week, once daily  Duration:  for 4 weeks tapering to 2x/month for 2 months  Discharge Plan: Achieve all LTGs, be independent in home treatment program, and reach maximal therapeutic benefit.    Thank you for your referral.    INQUIRIES  Therapist: Khurram Casey PT   [facility] [address] [address]  Phone: [phone]  Fax: [phone]

## 2021-01-07 ENCOUNTER — THERAPY VISIT (OUTPATIENT)
Dept: PHYSICAL THERAPY | Facility: CLINIC | Age: 31
End: 2021-01-07
Payer: COMMERCIAL

## 2021-01-07 DIAGNOSIS — Z47.89 AFTERCARE FOLLOWING SURGERY OF THE MUSCULOSKELETAL SYSTEM: ICD-10-CM

## 2021-01-07 DIAGNOSIS — M25.519 SHOULDER PAIN: ICD-10-CM

## 2021-01-07 DIAGNOSIS — M54.2 NECK PAIN: ICD-10-CM

## 2021-01-07 PROCEDURE — 97140 MANUAL THERAPY 1/> REGIONS: CPT | Mod: GP | Performed by: PHYSICAL THERAPIST

## 2021-01-07 PROCEDURE — 97110 THERAPEUTIC EXERCISES: CPT | Mod: GP | Performed by: PHYSICAL THERAPIST

## 2021-01-10 ENCOUNTER — HEALTH MAINTENANCE LETTER (OUTPATIENT)
Age: 31
End: 2021-01-10

## 2021-01-13 ENCOUNTER — THERAPY VISIT (OUTPATIENT)
Dept: PHYSICAL THERAPY | Facility: CLINIC | Age: 31
End: 2021-01-13
Payer: COMMERCIAL

## 2021-01-13 DIAGNOSIS — M54.2 NECK PAIN: ICD-10-CM

## 2021-01-13 DIAGNOSIS — Z47.89 AFTERCARE FOLLOWING SURGERY OF THE MUSCULOSKELETAL SYSTEM: ICD-10-CM

## 2021-01-13 DIAGNOSIS — M25.519 SHOULDER PAIN: ICD-10-CM

## 2021-01-13 PROCEDURE — 97110 THERAPEUTIC EXERCISES: CPT | Mod: GP | Performed by: PHYSICAL THERAPIST

## 2021-01-13 PROCEDURE — 97140 MANUAL THERAPY 1/> REGIONS: CPT | Mod: GP | Performed by: PHYSICAL THERAPIST

## 2021-01-19 ENCOUNTER — THERAPY VISIT (OUTPATIENT)
Dept: PHYSICAL THERAPY | Facility: CLINIC | Age: 31
End: 2021-01-19
Payer: COMMERCIAL

## 2021-01-19 DIAGNOSIS — M25.519 SHOULDER PAIN: ICD-10-CM

## 2021-01-19 DIAGNOSIS — M54.2 NECK PAIN: ICD-10-CM

## 2021-01-19 DIAGNOSIS — Z47.89 AFTERCARE FOLLOWING SURGERY OF THE MUSCULOSKELETAL SYSTEM: ICD-10-CM

## 2021-01-19 PROCEDURE — 97110 THERAPEUTIC EXERCISES: CPT | Mod: GP | Performed by: PHYSICAL THERAPIST

## 2021-01-19 PROCEDURE — 97140 MANUAL THERAPY 1/> REGIONS: CPT | Mod: GP | Performed by: PHYSICAL THERAPIST

## 2021-01-25 ENCOUNTER — THERAPY VISIT (OUTPATIENT)
Dept: PHYSICAL THERAPY | Facility: CLINIC | Age: 31
End: 2021-01-25
Payer: COMMERCIAL

## 2021-01-25 DIAGNOSIS — Z47.89 AFTERCARE FOLLOWING SURGERY OF THE MUSCULOSKELETAL SYSTEM: ICD-10-CM

## 2021-01-25 DIAGNOSIS — M25.519 SHOULDER PAIN: ICD-10-CM

## 2021-01-25 DIAGNOSIS — M54.2 NECK PAIN: ICD-10-CM

## 2021-01-25 PROCEDURE — 97110 THERAPEUTIC EXERCISES: CPT | Mod: GP | Performed by: PHYSICAL THERAPIST

## 2021-01-25 PROCEDURE — 97140 MANUAL THERAPY 1/> REGIONS: CPT | Mod: GP | Performed by: PHYSICAL THERAPIST

## 2021-03-11 ENCOUNTER — THERAPY VISIT (OUTPATIENT)
Dept: PHYSICAL THERAPY | Facility: CLINIC | Age: 31
End: 2021-03-11
Payer: COMMERCIAL

## 2021-03-11 DIAGNOSIS — M54.2 NECK PAIN: ICD-10-CM

## 2021-03-11 DIAGNOSIS — M25.519 SHOULDER PAIN: ICD-10-CM

## 2021-03-11 DIAGNOSIS — Z47.89 AFTERCARE FOLLOWING SURGERY OF THE MUSCULOSKELETAL SYSTEM: ICD-10-CM

## 2021-03-11 PROCEDURE — 97110 THERAPEUTIC EXERCISES: CPT | Mod: GP | Performed by: PHYSICAL THERAPIST

## 2021-03-11 PROCEDURE — 97140 MANUAL THERAPY 1/> REGIONS: CPT | Mod: GP | Performed by: PHYSICAL THERAPIST

## 2021-03-11 PROCEDURE — 97530 THERAPEUTIC ACTIVITIES: CPT | Mod: GP | Performed by: PHYSICAL THERAPIST

## 2021-03-11 NOTE — PROGRESS NOTES
Progress Note    Progress reporting period is from initial eval to Mar 11, 2021.        SUBJECTIVE  Returns to PT after working remotely from [place] for 1 month. She was able to participate in PT while in [place]. Reports she was doing much better with regard to her shoulder function. Neck can still be bothersome at times. 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 Prolonged computer use continues to be worst provocative activity for neck pain.  Previous pain level was  Initial Pain level: 6/10.   Changes in function:  Yes (See Goal flowsheet attached for changes in current functional level)  Adverse reaction to treatment or activity: None    OBJECTIVE  Flexion 170deg (contralateral 180deg).   TA on R: 105 ER, 62 IR. On L: 90 ER, 72 IR.   Cervical ROM WNL with exception of L SB limited 33% and L rotation limited 25%.     Strength Testing: Handheld Dynamometry     (lbs force)   Left Right % contralateral   ER 16 15 94%   IR 21 15 71%   ABD 9 7 78%         ASSESSMENT/PLAN  Diagnosis: s/p R 1st rib resection and scalenectomy   DIAGP:  Diagnoses of Aftercare following surgery of the musculoskeletal system, Neck pain, and Shoulder pain were pertinent to this visit.  Updated problem list and treatment plan:   Diagnosis 1:  S/p R 1st rib resection and scalenectomy    Decreased ROM/flexibility - manual therapy, therapeutic exercise and home program  Decreased joint mobility - manual therapy and therapeutic exercise  Decreased strength - therapeutic exercise, therapeutic activities and home program  Impaired muscle performance - neuro re-education  Decreased function - therapeutic activities    STG/LTGs have been met or progress has been made towards goals:  Yes, please see goal flowsheet for most current information.    Assessment of Progress: The patient's condition is improving.  The patient's condition has potential to improve.  Self Management Plans:   Patient has been instructed in a home treatment program.  I have re-evaluated this patient and find that the nature, scope, duration and intensity of the therapy is appropriate for the medical condition of the patient.  Melia continues to require the following intervention to meet STG and LTG's:  PT.    Recommendations:  This patient would benefit from continued therapy.     Frequency:  2 X a month, once daily  Duration:  for 2 months      Khurram Casey, PT, DPT, OCS    Please refer to the daily flowsheet for treatment today, total treatment time and time spent performing 1:1 timed codes.

## 2021-03-18 ENCOUNTER — THERAPY VISIT (OUTPATIENT)
Dept: PHYSICAL THERAPY | Facility: CLINIC | Age: 31
End: 2021-03-18
Payer: COMMERCIAL

## 2021-03-18 DIAGNOSIS — M54.2 NECK PAIN: ICD-10-CM

## 2021-03-18 DIAGNOSIS — Z47.89 AFTERCARE FOLLOWING SURGERY OF THE MUSCULOSKELETAL SYSTEM: ICD-10-CM

## 2021-03-18 DIAGNOSIS — M25.519 SHOULDER PAIN: ICD-10-CM

## 2021-03-18 PROCEDURE — 97530 THERAPEUTIC ACTIVITIES: CPT | Mod: GP | Performed by: PHYSICAL THERAPIST

## 2021-03-18 PROCEDURE — 97110 THERAPEUTIC EXERCISES: CPT | Mod: GP | Performed by: PHYSICAL THERAPIST

## 2021-03-18 PROCEDURE — 97112 NEUROMUSCULAR REEDUCATION: CPT | Mod: GP | Performed by: PHYSICAL THERAPIST

## 2021-04-28 ENCOUNTER — VIRTUAL VISIT (OUTPATIENT)
Dept: NEUROLOGY | Facility: CLINIC | Age: 31
End: 2021-04-28
Payer: COMMERCIAL

## 2021-04-28 DIAGNOSIS — F41.9 ANXIETY: Primary | ICD-10-CM

## 2021-04-28 DIAGNOSIS — G54.0 THORACIC OUTLET SYNDROME: ICD-10-CM

## 2021-04-28 DIAGNOSIS — H57.11 EYE PAIN, RIGHT: ICD-10-CM

## 2021-04-28 DIAGNOSIS — I87.1 COMPRESSION OF VEIN: ICD-10-CM

## 2021-04-28 PROCEDURE — 99214 OFFICE O/P EST MOD 30 MIN: CPT | Mod: 95 | Performed by: PSYCHIATRY & NEUROLOGY

## 2021-04-28 RX ORDER — IBUPROFEN 200 MG
400 TABLET ORAL
COMMUNITY
End: 2023-11-13

## 2021-04-28 RX ORDER — VENLAFAXINE HYDROCHLORIDE 37.5 MG/1
37.5 TABLET, EXTENDED RELEASE ORAL DAILY
Qty: 30 TABLET | Refills: 3 | Status: SHIPPED | OUTPATIENT
Start: 2021-04-28 | End: 2023-11-13

## 2021-04-28 NOTE — Clinical Note
4/28/2021       RE: Melia Braxton  [address]  [place] [facility] [address]     Dear Colleague,    Thank you for referring your patient, Melia Braxton, to the [facility]UROLOG[facility]. Please see a copy of my visit note below.    No notes on file    Again, thank you for allowing me to participate in the care of your patient.      Sincerely,    Ernesto ESTEBAN Cha, MD

## 2021-04-28 NOTE — PROGRESS NOTES
Melia is a 30 year old who is being evaluated via a billable video visit.      How would you like to obtain your AVS? MyChart  If the video visit is dropped, the invitation should be resent by: Send to e-mail at: [email or web address]  Will anyone else be joining your video visit? No  {If patient encounters technical issues they should call [phone] :[id number]}    Video Start Time: {video visit start/end time for provider to select:272555}  Video-Visit Details    Type of service:  Video Visit    Video End Time:{video visit start/end time for provider to select:455943}    Originating Location (pt. Location): Home    Distant Location (provider location):  [facility] NEUROLOGY [facility]     Platform used for Video Visit: [employer]

## 2021-04-28 NOTE — LETTER
4/28/2021       RE: Melia Braxton  [address]  [place] [facility] [address]     Dear Colleague,    Thank you for referring your patient, Melia Braxton, to the [facility] NEUROLOG[facility] CLINIC [place] [facility]. Please see a copy of my visit note below.    The patient is being evaluated via a billable video visit.    How would you like to obtain your AVS? MyChart  If the video visit is dropped, the invitation should be resent by: Send to e-mail at: [email or web address]  Will anyone else be joining your video visit? No      Video-Visit Details  Type of service:  Video Visit  Video Start Time: 4:35PM  Video End Time: 4:55PM   Originating Location (pt. Location): Home  Distant Location (provider location):  [facility] NEUROLOGY [facility]   Platform used for Video Visit: [employer]    Follow-up 10-14-20, 9-30-20, 6-19-20, 5-22-20  Patient is a 30-year-old woman with a past history of having a tree fall on the right side of her head and body who had experienced 3 episodes of severe right sided headache along with right eye visual loss starting in March 2020 before presenting for evaluation on 5-22-20.  She was diagnosed with right sided venous thoracic outlet syndrome because of concurrent right-sided neck pain and paresthesias in the right hand.      Her ultrasound TOS protocol from 6-03-20 showed complete obstruction of right subclavian venous flow in the 135 and 180 degree abduction positions.  She had a tripling of velocity from the neutral to the 90 degree position which typically indicates about a 75% luminal narrowing. There was milder evidence of arterial TOS on the same side. Left-sided subclavian velocities also tripled between 90 degrees and 135 degrees. CT scan of the chest showed no externally obstructive lesions but mild narrowing of the right subclavian vein. MRI brain and orbits from 10-13-20 was normal.      She had done fairly  "extensive physical therapy and then was referred for decompressive surgery with Dr. Dimas Moreno.  On November 23, 2020 she underwent a right periclavicular thoracic outlet decompression with a rib resection scalene ectomy brachial plexus neurolysis as well as external venal lysis of the subclavian vein and right internal jugular vein. She has continued to do physical therapy after the surgery.    Patient note from 3-7-21  \"Yes, it went well minus my own emotional hang-ups about everything. I'm maintaining physical therapy and feeling better day by day. The right eye socket area symptoms have persisted, although seemingly less acute. There's a constant pressure that intensifies with movement, position changes, stress. I *think* it's decreasing overall. I experienced one significant migraine several weeks ago (nausea & light sensitivity), but nothing compared to the pattern last year.\"     Her symptoms are much better but she still has a constant pressure around the right socket area, especially above the eye, especially when laying down.  This pain can be exacerbated several times a day but they are fleeting feelings. There is sometimes a feeling of a \"rush\" like a wave like blood flow into the area.  The main triggers are: laying down, doing a chin tuck, and turning the head to the left. The pain is overall 75-80% better and she has not had any severe episodes of visual loss or migraine.  She has tried biking and has been doing PT. She has no symptoms in the arms at all.  Of note, she has rarely taken her Ritalin twice in the last 5 months.  She has been off of the amlodipine.  She has been having some emotional difficulty grappling with having had surgery    Plan: She has been doing excellent after the surgery with improvement of her most severe symptoms. Given some residual eye pressure as well as anxiety about having had major surgery a trial of venlafaxine may be reasonable.  We discussed the pros and cons of " starting this medication.  We can start at a low dose to be taken in the morning. Side effects such as insomnia and jitteriness were discussed.  We will start at 37.5mg QAM.  She will also add some anterior scalene and sternocleidomastoid stretches.  We will follow-up in 2 to 3 months.  She will contact me if she has any difficulty with the medication in the interim.     36-minutes were spent in evaluation, examination, and counseling as well as documentation on the date of service. After a review of the patient s situation, this visit was changed from an in-person visit to a video visit to reduce the risk of COVID-19 exposure.      Again, thank you for allowing me to participate in the care of your patient.      Sincerely,    Ernesto ESTEBAN Cha, MD

## 2021-04-28 NOTE — PROGRESS NOTES
The patient is being evaluated via a billable video visit.    How would you like to obtain your AVS? MyChart  If the video visit is dropped, the invitation should be resent by: Send to e-mail at: [email or web address]  Will anyone else be joining your video visit? No      Video-Visit Details  Type of service:  Video Visit  Video Start Time: 4:35PM  Video End Time: 4:55PM   Originating Location (pt. Location): Home  Distant Location (provider location):  [facility] NEUROLOGY [facility]   Platform used for Video Visit: [facility]    Follow-up 10-14-20, 9-30-20, 6-19-20, 5-22-20  Patient is a 30-year-old woman with a past history of having a tree fall on the right side of her head and body who had experienced 3 episodes of severe right sided headache along with right eye visual loss starting in March 2020 before presenting for evaluation on 5-22-20.  She was diagnosed with right sided venous thoracic outlet syndrome because of concurrent right-sided neck pain and paresthesias in the right hand.      Her ultrasound TOS protocol from 6-03-20 showed complete obstruction of right subclavian venous flow in the 135 and 180 degree abduction positions.  She had a tripling of velocity from the neutral to the 90 degree position which typically indicates about a 75% luminal narrowing. There was milder evidence of arterial TOS on the same side. Left-sided subclavian velocities also tripled between 90 degrees and 135 degrees. CT scan of the chest showed no externally obstructive lesions but mild narrowing of the right subclavian vein. MRI brain and orbits from 10-13-20 was normal.      She had done fairly extensive physical therapy and then was referred for decompressive surgery with Dr. Dimas Moreno.  On November 23, 2020 she underwent a right periclavicular thoracic outlet decompression with a rib resection scalene ectomy brachial plexus neurolysis as well as external venal lysis of the subclavian vein and right  "internal jugular vein. She has continued to do physical therapy after the surgery.    Patient note from 3-7-21  \"Yes, it went well minus my own emotional hang-ups about everything. I'm maintaining physical therapy and feeling better day by day. The right eye socket area symptoms have persisted, although seemingly less acute. There's a constant pressure that intensifies with movement, position changes, stress. I *think* it's decreasing overall. I experienced one significant migraine several weeks ago (nausea & light sensitivity), but nothing compared to the pattern last year.\"     Her symptoms are much better but she still has a constant pressure around the right socket area, especially above the eye, especially when laying down.  This pain can be exacerbated several times a day but they are fleeting feelings. There is sometimes a feeling of a \"rush\" like a wave like blood flow into the area.  The main triggers are: laying down, doing a chin tuck, and turning the head to the left. The pain is overall 75-80% better and she has not had any severe episodes of visual loss or migraine.  She has tried biking and has been doing PT. She has no symptoms in the arms at all.  Of note, she has rarely taken her Ritalin twice in the last 5 months.  She has been off of the amlodipine.  She has been having some emotional difficulty grappling with having had surgery    Plan: She has been doing excellent after the surgery with improvement of her most severe symptoms. Given some residual eye pressure as well as anxiety about having had major surgery a trial of venlafaxine may be reasonable.  We discussed the pros and cons of starting this medication.  We can start at a low dose to be taken in the morning. Side effects such as insomnia and jitteriness were discussed.  We will start at 37.5mg QAM.  She will also add some anterior scalene and sternocleidomastoid stretches.  We will follow-up in 2 to 3 months.  She will contact me if she " has any difficulty with the medication in the interim.     36-minutes were spent in evaluation, examination, and counseling as well as documentation on the date of service. After a review of the patient s situation, this visit was changed from an in-person visit to a video visit to reduce the risk of COVID-19 exposure.

## 2021-10-23 ENCOUNTER — HEALTH MAINTENANCE LETTER (OUTPATIENT)
Age: 31
End: 2021-10-23

## 2022-01-14 PROBLEM — Z47.89 AFTERCARE FOLLOWING SURGERY OF THE MUSCULOSKELETAL SYSTEM: Status: RESOLVED | Noted: 2020-12-29 | Resolved: 2022-01-14

## 2022-01-14 PROBLEM — M25.519 SHOULDER PAIN: Status: RESOLVED | Noted: 2020-12-29 | Resolved: 2022-01-14

## 2022-01-14 PROBLEM — M54.2 NECK PAIN: Status: RESOLVED | Noted: 2020-12-29 | Resolved: 2022-01-14

## 2022-01-14 NOTE — PROGRESS NOTES
Discharge Note    Progress reporting period is from initial eval to Mar 18, 2021.     Melia failed to return for next follow up visit and current status is unknown.  Please see information below for last relevant information on current status.  Patient seen for   visits.    SUBJECTIVE  Subjective changes noted by patient:  Subjective: Going to start personal training tomorrow. .  Current pain level is  .     Previous pain level was  Initial Pain level: 6/10.   Changes in function:  Yes (See Goal flowsheet attached for changes in current functional level)  Adverse reaction to treatment or activity: None    OBJECTIVE  Changes noted in objective findings:  .    ASSESSMENT/PLAN  Diagnosis: s/p R 1st rib resection and scalenectomy   DIAGP:  Diagnoses of Aftercare following surgery of the musculoskeletal system, Neck pain, and Shoulder pain were pertinent to this visit.  Updated problem list and treatment plan:     Decreased ROM/flexibility - HEP  Decreased function - HEP  Decreased strength - HEP  Impaired muscle performance - HEP    STG/LTGs have been met or progress has been made towards goals:  Yes, please see goal flowsheet for most current information.    Assessment of Progress: current status is unknown.  Last current status:  .  Self Management Plans:  HEP    I have re-evaluated this patient and find that the nature, scope, duration and intensity of the therapy is appropriate for the medical condition of the patient.  Melia continues to require the following intervention to meet STG and LTG's:  HEP.    Recommendations:  Discharge with current home program.  Patient to follow up with MD as needed. Episode to be closed at this time and patient formally discharged from therapy.    Khurram Casey, PT, DPT, OCS      Please refer to the daily flowsheet for treatment today, total treatment time and time spent performing 1:1 timed codes.

## 2022-02-12 ENCOUNTER — HEALTH MAINTENANCE LETTER (OUTPATIENT)
Age: 32
End: 2022-02-12

## 2022-06-15 ENCOUNTER — TRANSFERRED RECORDS (OUTPATIENT)
Dept: HEALTH INFORMATION MANAGEMENT | Facility: CLINIC | Age: 32
End: 2022-06-15

## 2022-07-22 ENCOUNTER — VIRTUAL VISIT (OUTPATIENT)
Dept: NEUROLOGY | Facility: CLINIC | Age: 32
End: 2022-07-22
Payer: COMMERCIAL

## 2022-07-22 DIAGNOSIS — I87.1 COMPRESSION OF VEIN: Primary | ICD-10-CM

## 2022-07-22 DIAGNOSIS — H57.11 EYE PAIN, RIGHT: ICD-10-CM

## 2022-07-22 DIAGNOSIS — G54.0 THORACIC OUTLET SYNDROME: ICD-10-CM

## 2022-07-22 DIAGNOSIS — R60.0 LOCALIZED EDEMA: ICD-10-CM

## 2022-07-22 PROCEDURE — 99215 OFFICE O/P EST HI 40 MIN: CPT | Mod: 95 | Performed by: PSYCHIATRY & NEUROLOGY

## 2022-07-22 RX ORDER — FUROSEMIDE 20 MG
20 TABLET ORAL 2 TIMES DAILY
Qty: 60 TABLET | Refills: 3 | Status: SHIPPED | OUTPATIENT
Start: 2022-07-22 | End: 2023-11-13

## 2022-07-22 RX ORDER — ALPRAZOLAM 0.5 MG
TABLET ORAL
COMMUNITY
Start: 2022-06-19

## 2022-07-22 NOTE — LETTER
Date:July 25, 2022      Provider requested that no letter be sent. Do not send.       [facility]

## 2022-07-22 NOTE — LETTER
Date:July 25, 2022      Provider requested that no letter be sent. Do not send.       [facility]

## 2022-07-22 NOTE — LETTER
7/22/2022       RE: Melia Braxton  [address]  [facility] [address]     Dear Colleague,    Thank you for referring your patient, Melia Braxton, to the [facility]UROLOG[facility] [place] at [facility]. Please see a copy of my visit note below.    The patient is being evaluated via a billable video visit.    How would you like to obtain your AVS? MyChart  If the video visit is dropped, the invitation should be resent by: Send to e-mail at: [email or web address]  Will anyone else be joining your video visit? No      Video-Visit Details  Type of service:  Video Visit  Video Start Time:4:15 PM  Video End Time:4:49 PM  Originating Location (pt. Location): Home  Distant Location (provider location):  [facility] NEUROLOGY [facility]   Platform used for Video Visit: [facility]    Follow-up 10-14-20, 9-30-20, 6-19-20, 5-22-20, 4-28-21  Patient is a 31-year-old woman with a past history of having a tree fall on the right side of her head and body who had experienced 3 episodes of severe right sided headache along with right eye visual loss starting in March 2020 before presenting for evaluation on 5-22-20.  She was diagnosed with right sided venous thoracic outlet syndrome because of concurrent right-sided neck pain and paresthesias in the right hand.      Her ultrasound TOS protocol from 6-03-20 showed complete obstruction of right subclavian venous flow in the 135 and 180 degree abduction positions.  She had a tripling of velocity from the neutral to the 90 degree position which typically indicates about a 75% luminal narrowing. There was milder evidence of arterial TOS on the same side. Left-sided subclavian velocities also tripled between 90 degrees and 135 degrees. CT scan of the chest showed no externally obstructive lesions but mild narrowing of the right subclavian vein. MRI brain and orbits from 10-13-20 was normal.       She had done  "fairly extensive physical therapy and then was referred for decompressive surgery with Dr. Dimas Moreno.  On November 23, 2020 she underwent a right periclavicular thoracic outlet decompression with a rib resection scalenectomy brachial plexus neurolysis as well as external venal lysis of the subclavian vein and right internal jugular vein. She has continued to do physical therapy after the surgery.     Patient note from 3-7-21  \"Yes, it went well minus my own emotional hang-ups about everything. I'm maintaining physical therapy and feeling better day by day. The right eye socket area symptoms have persisted, although seemingly less acute. There's a constant pressure that intensifies with movement, position changes, stress. I *think* it's decreasing overall. I experienced one significant migraine several weeks ago (nausea & light sensitivity), but nothing compared to the pattern last year.\"      Her symptoms are much better but she still has a constant pressure around the right socket area, especially above the eye, especially when laying down.  This pain can be exacerbated several times a day but they are fleeting feelings. There is sometimes a feeling of a \"rush\" like a wave like blood flow into the area.  The main triggers are: laying down, doing a chin tuck, and turning the head to the left. The pain is overall 75-80% better and she has not had any severe episodes of visual loss or migraine.  She has tried biking and has been doing PT. She has no symptoms in the arms at all.  Of note, she has rarely taken her Ritalin twice in the last 5 months.  She has been off of the amlodipine.  She has been having some emotional difficulty grappling with having had surgery     Plan: She has been doing excellent after the surgery with improvement of her most severe symptoms. Given some residual eye pressure as well as anxiety about having had major surgery a trial of venlafaxine may be reasonable.  We discussed the pros and " "cons of starting this medication.  We can start at a low dose to be taken in the morning. Side effects such as insomnia and jitteriness were discussed.  We will start at 37.5mg QAM.  She will also add some anterior scalene and sternocleidomastoid stretches.  We will follow-up in 2 to 3 months.  She will contact me if she has any difficulty with the medication in the interim.      Interim History 7-22-22:  The migraines have completely resolved. There is always kind of a mild pressure in the right eye socket however around a 2/10. This pressure can be higher laying on cough, turning to left. She can get burning in eyeball when biking, and after exercise.     She has been taking it slow getting back into exercise, yoga, pilates, etc. She did a more heavy pilates work-out on the [employer] \"boot-camp,\" on a machine for 45-minutes, about 5 days ago. She was feeling good during the class but about an hour after the exercise, the right eye became very very painful. She had to lay down. The pressure was just in the right eye, the vision became impaired in just the right eye and it took about an hour to resolve. The baseline pressure level was not clearly higher after that. There was no vertigo, tinnitus, or ear pressure. She did not start the venlafaxine because of side effects related by friends. Occasionally the vision in the right eye can get blurry.     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. She had a previous left foot fracture in 2010 and then broke it again in 2019 which was unknown so she had no protection. She is not supposed to take NSAIDs for the foot.     Plan:  1. [facility]  2. CT-Venogram after ultrasound  3. Restart Lasix for baseline eye pressure (stopped last May 2021).   4. Consider preventative with calcium channel blocker depending on " imaging (patient was previously on amlodipine before surgery).   5. Consider referral to neuro-ophthalmology to for optical coherence tomography in asymptomatic and symptomatic states if the visual blurring can be triggered.       DATA:  I personally reviewed the following data.    Last brain imaging:  MRI Brain and orbits  Narrative: MR BRAIN AND ORBITS 10/13/2020 3:39 PM    Provided History:  Headache, acute, normal neuro exam; Headache,  basilar or orbital; 29F with RIGHT eye pressure. ?Aneurysm, venous  anomalies; Eye pain, right  ICD-10: Eye pain, right    Comparison:  CT dated 10/5/2020     Technique:    1. MRI of the Brain:  Sagittal T1-weighted, axial turboFLAIR and axial  diffusion-weighted with ADC map images of the brain were obtained  without intravenous contrast.  After intravenous administration of  gadolinium, axial T1-weighted images of the brain were obtained.    2. MRI of the Orbits focused on the orbits/visual pathways:  Axial  T2-weighted with inversion recovery and coronal T1-weighted images  were obtained without intravenous contrast. Axial and coronal  T1-weighted images with fat saturation were obtained after intravenous  gadolinium administration.    Contrast: 7 mL Gadavist IV    Findings:  No mass effect or midline shift. No extra-axial fluid  collections. No intracranial hemorrhage. The ventricles appear  proportional to the sulci. There is no hydrocephalus. There are no  sellar masses. The pituitary is normal. There is no abnormal  intracranial diffusion restriction. There is no abnormal intracranial  enhancement.    The orbits appear normal. There is no intraconal or extraconal mass,  hematoma or other defect. The globes appear normal. The rectus muscles  are normal. The optic nerves are unremarkable.    Debris in the left nasal cavity. The mastoid air cells are clear.  Impression: Impression:  Essentially normal MRI of the brain and orbits with a few  minimal nonspecific linear T2  hyperintensities in the frontal white  matter bilaterally, out of proportion to age.    I have personally reviewed the examination and initial interpretation  and I agree with the findings.    ANA HOLT MD    39-minutes were spent in evaluation and counseling as well as documentation on the date of service. After a review of the patient s situation, this visit was changed from an in-person visit to a video visit to reduce the risk of COVID-19 exposure.          Again, thank you for allowing me to participate in the care of your patient.      Sincerely,    Ernesto ESTEBAN Cha, MD

## 2022-07-22 NOTE — PROGRESS NOTES
The patient is being evaluated via a billable video visit.    How would you like to obtain your AVS? MyChart  If the video visit is dropped, the invitation should be resent by: Send to e-mail at: [email or web address]  Will anyone else be joining your video visit? No      Video-Visit Details  Type of service:  Video Visit  Video Start Time:4:15 PM  Video End Time:4:49 PM  Originating Location (pt. Location): Home  Distant Location (provider location):  [facility] NEUROLOGY [facility]   Platform used for Video Visit: [facility]    Follow-up 10-14-20, 9-30-20, 6-19-20, 5-22-20, 4-28-21  Patient is a 31-year-old woman with a past history of having a tree fall on the right side of her head and body who had experienced 3 episodes of severe right sided headache along with right eye visual loss starting in March 2020 before presenting for evaluation on 5-22-20.  She was diagnosed with right sided venous thoracic outlet syndrome because of concurrent right-sided neck pain and paresthesias in the right hand.      Her ultrasound TOS protocol from 6-03-20 showed complete obstruction of right subclavian venous flow in the 135 and 180 degree abduction positions.  She had a tripling of velocity from the neutral to the 90 degree position which typically indicates about a 75% luminal narrowing. There was milder evidence of arterial TOS on the same side. Left-sided subclavian velocities also tripled between 90 degrees and 135 degrees. CT scan of the chest showed no externally obstructive lesions but mild narrowing of the right subclavian vein. MRI brain and orbits from 10-13-20 was normal.       She had done fairly extensive physical therapy and then was referred for decompressive surgery with Dr. Dimas Moreno.  On November 23, 2020 she underwent a right periclavicular thoracic outlet decompression with a rib resection scalenectomy brachial plexus neurolysis as well as external venal lysis of the subclavian vein and right  "internal jugular vein. She has continued to do physical therapy after the surgery.     Patient note from 3-7-21  \"Yes, it went well minus my own emotional hang-ups about everything. I'm maintaining physical therapy and feeling better day by day. The right eye socket area symptoms have persisted, although seemingly less acute. There's a constant pressure that intensifies with movement, position changes, stress. I *think* it's decreasing overall. I experienced one significant migraine several weeks ago (nausea & light sensitivity), but nothing compared to the pattern last year.\"      Her symptoms are much better but she still has a constant pressure around the right socket area, especially above the eye, especially when laying down.  This pain can be exacerbated several times a day but they are fleeting feelings. There is sometimes a feeling of a \"rush\" like a wave like blood flow into the area.  The main triggers are: laying down, doing a chin tuck, and turning the head to the left. The pain is overall 75-80% better and she has not had any severe episodes of visual loss or migraine.  She has tried biking and has been doing PT. She has no symptoms in the arms at all.  Of note, she has rarely taken her Ritalin twice in the last 5 months.  She has been off of the amlodipine.  She has been having some emotional difficulty grappling with having had surgery     Plan: She has been doing excellent after the surgery with improvement of her most severe symptoms. Given some residual eye pressure as well as anxiety about having had major surgery a trial of venlafaxine may be reasonable.  We discussed the pros and cons of starting this medication.  We can start at a low dose to be taken in the morning. Side effects such as insomnia and jitteriness were discussed.  We will start at 37.5mg QAM.  She will also add some anterior scalene and sternocleidomastoid stretches.  We will follow-up in 2 to 3 months.  She will contact me if " "she has any difficulty with the medication in the interim.      Interim History 7-22-22:  The migraines have completely resolved. There is always kind of a mild pressure in the right eye socket however around a 2/10. This pressure can be higher laying on cough, turning to left. She can get burning in eyeball when biking, and after exercise.     She has been taking it slow getting back into exercise, yoga, pilates, etc. She did a more heavy pilates work-out on the [employer] \"boot-camp,\" on a machine for 45-minutes, about 5 days ago. She was feeling good during the class but about an hour after the exercise, the right eye became very very painful. She had to lay down. The pressure was just in the right eye, the vision became impaired in just the right eye and it took about an hour to resolve. The baseline pressure level was not clearly higher after that. There was no vertigo, tinnitus, or ear pressure. She did not start the venlafaxine because of side effects related by friends. Occasionally the vision in the right eye can get blurry.     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 She had a previous left foot fracture in 2010 and then broke it again in 2019 which was unknown so she had no protection. She is not supposed to take NSAIDs for the foot.     Plan:  1. [facility]  2. CT-Venogram after ultrasound  3. Restart Lasix for baseline eye pressure (stopped last May 2021).   4. Consider preventative with calcium channel blocker depending on imaging (patient was previously on amlodipine before surgery).   5. Consider referral to neuro-ophthalmology to for optical coherence tomography in asymptomatic and symptomatic states if the visual blurring can be triggered.       DATA:  I personally reviewed the following data.    Last brain imaging:  MRI Brain and " orbits  Narrative: MR BRAIN AND ORBITS 10/13/2020 3:39 PM    Provided History:  Headache, acute, normal neuro exam; Headache,  basilar or orbital; 29F with RIGHT eye pressure. ?Aneurysm, venous  anomalies; Eye pain, right  ICD-10: Eye pain, right    Comparison:  CT dated 10/5/2020     Technique:    1. MRI of the Brain:  Sagittal T1-weighted, axial turboFLAIR and axial  diffusion-weighted with ADC map images of the brain were obtained  without intravenous contrast.  After intravenous administration of  gadolinium, axial T1-weighted images of the brain were obtained.    2. MRI of the Orbits focused on the orbits/visual pathways:  Axial  T2-weighted with inversion recovery and coronal T1-weighted images  were obtained without intravenous contrast. Axial and coronal  T1-weighted images with fat saturation were obtained after intravenous  gadolinium administration.    Contrast: 7 mL Gadavist IV    Findings:  No mass effect or midline shift. No extra-axial fluid  collections. No intracranial hemorrhage. The ventricles appear  proportional to the sulci. There is no hydrocephalus. There are no  sellar masses. The pituitary is normal. There is no abnormal  intracranial diffusion restriction. There is no abnormal intracranial  enhancement.    The orbits appear normal. There is no intraconal or extraconal mass,  hematoma or other defect. The globes appear normal. The rectus muscles  are normal. The optic nerves are unremarkable.    Debris in the left nasal cavity. The mastoid air cells are clear.  Impression: Impression:  Essentially normal MRI of the brain and orbits with a few  minimal nonspecific linear T2 hyperintensities in the frontal white  matter bilaterally, out of proportion to age.    I have personally reviewed the examination and initial interpretation  and I agree with the findings.    ANA HOLT MD    39-minutes were spent in evaluation and counseling as well as documentation on the date of service. After a  review of the patient s situation, this visit was changed from an in-person visit to a video visit to reduce the risk of COVID-19 exposure.

## 2022-07-22 NOTE — LETTER
7/22/2022       RE: Melia Braxton  [address]  [facility] [address]     Dear Colleague,    Thank you for referring your patient, Melia Braxton, to the [facility]UROLOG[facility] [place] at [facility]. Please see a copy of my visit note below.    The patient is being evaluated via a billable video visit.    How would you like to obtain your AVS? MyChart  If the video visit is dropped, the invitation should be resent by: Send to e-mail at: [email or web address]  Will anyone else be joining your video visit? No      Video-Visit Details  Type of service:  Video Visit  Video Start Time:4:15 PM  Video End Time:4:49 PM  Originating Location (pt. Location): Home  Distant Location (provider location):  [facility] NEUROLOGY [facility]   Platform used for Video Visit: [facility]    Follow-up 10-14-20, 9-30-20, 6-19-20, 5-22-20, 4-28-21  Patient is a 31-year-old woman with a past history of having a tree fall on the right side of her head and body who had experienced 3 episodes of severe right sided headache along with right eye visual loss starting in March 2020 before presenting for evaluation on 5-22-20.  She was diagnosed with right sided venous thoracic outlet syndrome because of concurrent right-sided neck pain and paresthesias in the right hand.      Her ultrasound TOS protocol from 6-03-20 showed complete obstruction of right subclavian venous flow in the 135 and 180 degree abduction positions.  She had a tripling of velocity from the neutral to the 90 degree position which typically indicates about a 75% luminal narrowing. There was milder evidence of arterial TOS on the same side. Left-sided subclavian velocities also tripled between 90 degrees and 135 degrees. CT scan of the chest showed no externally obstructive lesions but mild narrowing of the right subclavian vein. MRI brain and orbits from 10-13-20 was normal.       She had done  "fairly extensive physical therapy and then was referred for decompressive surgery with Dr. Dimas Moreno.  On November 23, 2020 she underwent a right periclavicular thoracic outlet decompression with a rib resection scalenectomy brachial plexus neurolysis as well as external venal lysis of the subclavian vein and right internal jugular vein. She has continued to do physical therapy after the surgery.     Patient note from 3-7-21  \"Yes, it went well minus my own emotional hang-ups about everything. I'm maintaining physical therapy and feeling better day by day. The right eye socket area symptoms have persisted, although seemingly less acute. There's a constant pressure that intensifies with movement, position changes, stress. I *think* it's decreasing overall. I experienced one significant migraine several weeks ago (nausea & light sensitivity), but nothing compared to the pattern last year.\"      Her symptoms are much better but she still has a constant pressure around the right socket area, especially above the eye, especially when laying down.  This pain can be exacerbated several times a day but they are fleeting feelings. There is sometimes a feeling of a \"rush\" like a wave like blood flow into the area.  The main triggers are: laying down, doing a chin tuck, and turning the head to the left. The pain is overall 75-80% better and she has not had any severe episodes of visual loss or migraine.  She has tried biking and has been doing PT. She has no symptoms in the arms at all.  Of note, she has rarely taken her Ritalin twice in the last 5 months.  She has been off of the amlodipine.  She has been having some emotional difficulty grappling with having had surgery     Plan: She has been doing excellent after the surgery with improvement of her most severe symptoms. Given some residual eye pressure as well as anxiety about having had major surgery a trial of venlafaxine may be reasonable.  We discussed the pros and " "cons of starting this medication.  We can start at a low dose to be taken in the morning. Side effects such as insomnia and jitteriness were discussed.  We will start at 37.5mg QAM.  She will also add some anterior scalene and sternocleidomastoid stretches.  We will follow-up in 2 to 3 months.  She will contact me if she has any difficulty with the medication in the interim.      Interim History 7-22-22:  The migraines have completely resolved. There is always kind of a mild pressure in the right eye socket however around a 2/10. This pressure can be higher laying on cough, turning to left. She can get burning in eyeball when biking, and after exercise.     She has been taking it slow getting back into exercise, yoga, pilates, etc. She did a more heavy pilates work-out on the reformer \"boot-camp,\" on a machine for 45-minutes, about 5 days ago. She was feeling good during the class but about an hour after the exercise, the right eye became very very painful. She had to lay down. The pressure was just in the right eye, the vision became impaired in just the right eye and it took about an hour to resolve. The baseline pressure level was not clearly higher after that. There was no vertigo, tinnitus, or ear pressure. She did not start the venlafaxine because of side effects related by friends. Occasionally the vision in the right eye can get blurry.     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 She had a previous left foot fracture in 2010 and then broke it again in 2019 which was unknown so she had no protection. She is not supposed to take NSAIDs for the foot.     Plan:  1. [facility]  2. CT-Venogram after ultrasound  3. Restart Lasix for baseline eye pressure (stopped last May 2021).   4. Consider preventative with calcium channel blocker depending on " imaging (patient was previously on amlodipine before surgery).   5. Consider referral to neuro-ophthalmology to for optical coherence tomography in asymptomatic and symptomatic states if the visual blurring can be triggered.       DATA:  I personally reviewed the following data.    Last brain imaging:  MRI Brain and orbits  Narrative: MR BRAIN AND ORBITS 10/13/2020 3:39 PM    Provided History:  Headache, acute, normal neuro exam; Headache,  basilar or orbital; 29F with RIGHT eye pressure. ?Aneurysm, venous  anomalies; Eye pain, right  ICD-10: Eye pain, right    Comparison:  CT dated 10/5/2020     Technique:    1. MRI of the Brain:  Sagittal T1-weighted, axial turboFLAIR and axial  diffusion-weighted with ADC map images of the brain were obtained  without intravenous contrast.  After intravenous administration of  gadolinium, axial T1-weighted images of the brain were obtained.    2. MRI of the Orbits focused on the orbits/visual pathways:  Axial  T2-weighted with inversion recovery and coronal T1-weighted images  were obtained without intravenous contrast. Axial and coronal  T1-weighted images with fat saturation were obtained after intravenous  gadolinium administration.    Contrast: 7 mL Gadavist IV    Findings:  No mass effect or midline shift. No extra-axial fluid  collections. No intracranial hemorrhage. The ventricles appear  proportional to the sulci. There is no hydrocephalus. There are no  sellar masses. The pituitary is normal. There is no abnormal  intracranial diffusion restriction. There is no abnormal intracranial  enhancement.    The orbits appear normal. There is no intraconal or extraconal mass,  hematoma or other defect. The globes appear normal. The rectus muscles  are normal. The optic nerves are unremarkable.    Debris in the left nasal cavity. The mastoid air cells are clear.  Impression: Impression:  Essentially normal MRI of the brain and orbits with a few  minimal nonspecific linear T2  hyperintensities in the frontal white  matter bilaterally, out of proportion to age.    I have personally reviewed the examination and initial interpretation  and I agree with the findings.    ANA HOLT MD    39-minutes were spent in evaluation and counseling as well as documentation on the date of service. After a review of the patient s situation, this visit was changed from an in-person visit to a video visit to reduce the risk of COVID-19 exposure.          Again, thank you for allowing me to participate in the care of your patient.      Sincerely,    Ernesto ESTEBAN Cha, MD

## 2022-10-10 ENCOUNTER — HEALTH MAINTENANCE LETTER (OUTPATIENT)
Age: 32
End: 2022-10-10

## 2023-02-18 ENCOUNTER — HEALTH MAINTENANCE LETTER (OUTPATIENT)
Age: 33
End: 2023-02-18

## 2023-10-20 NOTE — TELEPHONE ENCOUNTER
DIAGNOSIS: left foot (freiberg's disease) Self / [employer]    APPOINTMENT DATE: 11/29/23   NOTES STATUS DETAILS   OFFICE NOTE from referring provider Internal SELF   MEDICATION LIST Internal    LABS     MRI [facility]  3/24/22 MR FOOT LEFT     7/14/22 MR FOOT LEFT    XRAYS (IMAGES & REPORTS) [facility] 2/20/22 XR FOOT LEFT   6/2/22 XR DEXA BONE DENSITY AXIAL    7/14/22 XR FOOT LEFT      Records Requested     October 20, 2023 8:53 AM   [id number]   [facility] PHONE: [phone] FAX: [phone]   Outcome REQUESTED     Records Requested     October 20, 2023 8:53 AM   [id number]   [facility] PHONE:  [phone]  FAX:  [phone]   Outcome  REQUESTED

## 2023-11-13 ENCOUNTER — OFFICE VISIT (OUTPATIENT)
Dept: FAMILY MEDICINE | Facility: CLINIC | Age: 33
End: 2023-11-13
Payer: COMMERCIAL

## 2023-11-13 VITALS
DIASTOLIC BLOOD PRESSURE: 79 MMHG | BODY MASS INDEX: 22.29 KG/M2 | TEMPERATURE: 98.4 F | WEIGHT: 142 LBS | HEIGHT: 67 IN | HEART RATE: 93 BPM | OXYGEN SATURATION: 98 % | SYSTOLIC BLOOD PRESSURE: 125 MMHG | RESPIRATION RATE: 16 BRPM

## 2023-11-13 DIAGNOSIS — E61.1 IRON DEFICIENCY: ICD-10-CM

## 2023-11-13 DIAGNOSIS — Z12.4 CERVICAL CANCER SCREENING: ICD-10-CM

## 2023-11-13 DIAGNOSIS — M92.72 FREIBERG'S DISEASE, LEFT: ICD-10-CM

## 2023-11-13 DIAGNOSIS — J45.20 MILD INTERMITTENT REACTIVE AIRWAY DISEASE WITHOUT COMPLICATION: Primary | ICD-10-CM

## 2023-11-13 DIAGNOSIS — G43.009 MIGRAINE WITHOUT AURA AND WITHOUT STATUS MIGRAINOSUS, NOT INTRACTABLE: ICD-10-CM

## 2023-11-13 DIAGNOSIS — Z11.4 SCREENING FOR HUMAN IMMUNODEFICIENCY VIRUS WITHOUT PRESENCE OF RISK FACTORS: ICD-10-CM

## 2023-11-13 DIAGNOSIS — Z11.59 NEED FOR HEPATITIS C SCREENING TEST: ICD-10-CM

## 2023-11-13 DIAGNOSIS — Z86.69 HISTORY OF THORACIC OUTLET SYNDROME: ICD-10-CM

## 2023-11-13 DIAGNOSIS — H53.8 BLURRED VISION: ICD-10-CM

## 2023-11-13 LAB
ALBUMIN SERPL BCG-MCNC: 4.5 G/DL (ref 3.5–5.2)
ALP SERPL-CCNC: 50 U/L (ref 35–104)
ALT SERPL W P-5'-P-CCNC: 15 U/L (ref 0–50)
ANION GAP SERPL CALCULATED.3IONS-SCNC: 10 MMOL/L (ref 7–15)
AST SERPL W P-5'-P-CCNC: 24 U/L (ref 0–45)
BILIRUB SERPL-MCNC: 0.3 MG/DL
BUN SERPL-MCNC: 8.5 MG/DL (ref 6–20)
CALCIUM SERPL-MCNC: 8.8 MG/DL (ref 8.6–10)
CHLORIDE SERPL-SCNC: 104 MMOL/L (ref 98–107)
CREAT SERPL-MCNC: 0.68 MG/DL (ref 0.51–0.95)
DEPRECATED HCO3 PLAS-SCNC: 23 MMOL/L (ref 22–29)
EGFRCR SERPLBLD CKD-EPI 2021: >90 ML/MIN/1.73M2
ERYTHROCYTE [DISTWIDTH] IN BLOOD BY AUTOMATED COUNT: 12.4 % (ref 10–15)
FERRITIN SERPL-MCNC: 16 NG/ML (ref 6–175)
GLUCOSE SERPL-MCNC: 78 MG/DL (ref 70–99)
HCT VFR BLD AUTO: 38.5 % (ref 35–47)
HCV AB SERPL QL IA: NONREACTIVE
HGB BLD-MCNC: 12.6 G/DL (ref 11.7–15.7)
HIV 1+2 AB+HIV1 P24 AG SERPL QL IA: NONREACTIVE
IRON BINDING CAPACITY (ROCHE): 337 UG/DL (ref 240–430)
IRON SATN MFR SERPL: 28 % (ref 15–46)
IRON SERPL-MCNC: 93 UG/DL (ref 37–145)
MCH RBC QN AUTO: 30.1 PG (ref 26.5–33)
MCHC RBC AUTO-ENTMCNC: 32.7 G/DL (ref 31.5–36.5)
MCV RBC AUTO: 92 FL (ref 78–100)
PLATELET # BLD AUTO: 270 10E3/UL (ref 150–450)
POTASSIUM SERPL-SCNC: 3.9 MMOL/L (ref 3.4–5.3)
PROT SERPL-MCNC: 7 G/DL (ref 6.4–8.3)
RBC # BLD AUTO: 4.18 10E6/UL (ref 3.8–5.2)
SODIUM SERPL-SCNC: 137 MMOL/L (ref 135–145)
WBC # BLD AUTO: 5 10E3/UL (ref 4–11)

## 2023-11-13 PROCEDURE — 83540 ASSAY OF IRON: CPT | Performed by: FAMILY MEDICINE

## 2023-11-13 PROCEDURE — 36415 COLL VENOUS BLD VENIPUNCTURE: CPT | Performed by: FAMILY MEDICINE

## 2023-11-13 PROCEDURE — 87389 HIV-1 AG W/HIV-1&-2 AB AG IA: CPT | Performed by: FAMILY MEDICINE

## 2023-11-13 PROCEDURE — 99204 OFFICE O/P NEW MOD 45 MIN: CPT | Performed by: FAMILY MEDICINE

## 2023-11-13 PROCEDURE — 82728 ASSAY OF FERRITIN: CPT | Performed by: FAMILY MEDICINE

## 2023-11-13 PROCEDURE — 83550 IRON BINDING TEST: CPT | Performed by: FAMILY MEDICINE

## 2023-11-13 PROCEDURE — 86803 HEPATITIS C AB TEST: CPT | Performed by: FAMILY MEDICINE

## 2023-11-13 PROCEDURE — 85027 COMPLETE CBC AUTOMATED: CPT | Performed by: FAMILY MEDICINE

## 2023-11-13 PROCEDURE — 80053 COMPREHEN METABOLIC PANEL: CPT | Performed by: FAMILY MEDICINE

## 2023-11-13 RX ORDER — ALBUTEROL SULFATE 90 UG/1
2 AEROSOL, METERED RESPIRATORY (INHALATION) EVERY 6 HOURS PRN
Qty: 18 G | Refills: 1 | Status: SHIPPED | OUTPATIENT
Start: 2023-11-13

## 2023-11-13 RX ORDER — CYCLOBENZAPRINE HCL 5 MG
5 TABLET ORAL 3 TIMES DAILY PRN
Qty: 30 TABLET | Refills: 0 | Status: SHIPPED | OUTPATIENT
Start: 2023-11-13 | End: 2023-11-13

## 2023-11-13 RX ORDER — RIZATRIPTAN BENZOATE 10 MG/1
10 TABLET ORAL
Qty: 12 TABLET | Refills: 0 | Status: SHIPPED | OUTPATIENT
Start: 2023-11-13

## 2023-11-13 RX ORDER — CYCLOBENZAPRINE HCL 5 MG
5 TABLET ORAL 3 TIMES DAILY PRN
Qty: 30 TABLET | Refills: 0 | Status: SHIPPED | OUTPATIENT
Start: 2023-11-13

## 2023-11-13 ASSESSMENT — PAIN SCALES - GENERAL: PAINLEVEL: MODERATE PAIN (4)

## 2023-11-13 NOTE — PROGRESS NOTES
Assessment & Plan     Mild intermittent reactive airway disease without complication  - albuterol (PROAIR HFA/PROVENTIL HFA/VENTOLIN HFA) 108 (90 Base) MCG/ACT inhaler; Inhale 2 puffs into the lungs every 6 hours as needed for shortness of breath, wheezing or cough    Migraine without aura and without status migrainosus, not intractable  Patient has a previous diagnosis of migraines.  She has less than 15 episodes per month.  She was previously taking Imitrex without significant improvement in her symptoms.  We switched her to rizatriptan today.  I would consider resuming in the future.  - rizatriptan (MAXALT) 10 MG tablet; Take 1 tablet (10 mg) by mouth at onset of headache for migraine May repeat in 2 hours. Max 3 tablets/24 hours.    Blurred vision  Intermittent episodes of blurry vision.  Few episodes are associated with her migraines other episodes are random.  Patient had a vision test with an optometrist 4 months ago and it did not reveal a specific abnormality.  Referred to ophthalmology  - Adult Eye  Referral; Future    History of thoracic outlet syndrome  Patient underwent right sided paraclavicular thoracic outlet decompression with venal lysis of the subclavian vein and internal jugular vein on 11/2020. She continues to have pain on the right side of the neck, right shoulder and right upper back.  Advised patient to schedule a follow-up with neurology or pain clinic.  She was given a prescription of cyclobenzaprine today.  - Pain Management  Referral; Future  - Comprehensive metabolic panel (BMP + Alb, Alk Phos, ALT, AST, Total. Bili, TP)  - CBC with platelets  - cyclobenzaprine (FLEXERIL) 5 MG tablet; Take 1 tablet (5 mg) by mouth 3 times daily as needed for muscle spasms    Screening for human immunodeficiency virus without presence of risk factors  - HIV Antigen Antibody Combo; Future    Iron deficiency  - Ferritin; Future  - Iron and iron binding capacity; Future  - CBC with  platelets; Future      Freiberg's disease, left  Has regular follow-up with podiatry  Cervical cancer screening  Patient recalls having a Pap smear    Need for hepatitis C screening test  - Hepatitis C Screen Reflex to HCV RNA Quant and Genotype; Future      Kelsey Rodriguez MD  [facility]    Genet Cárdenas is a 33 year old, presenting for the following health issues:  Establish Care, Asthma, and Headache        11/13/2023     9:15 AM   Additional Questions   Roomed by Raya GIRALDO   Accompanied by n/a       History of Present Illness       Reason for visit:  Establish primary care as well as discuss asthma, migraines    She eats 4 or more servings of fruits and vegetables daily.She consumes 0 sweetened beverage(s) daily.She exercises with enough effort to increase her heart rate 20 to 29 minutes per day.  She exercises with enough effort to increase her heart rate 4 days per week.      Asthma Follow-Up    Was ACT completed today?  Yes        11/13/2023     9:29 AM   ACT Total Scores   ACT TOTAL SCORE (Goal Greater than or Equal to 20) 21   In the past 12 months, how many times did you visit the emergency room for your asthma without being admitted to the hospital? 0   In the past 12 months, how many times were you hospitalized overnight because of your asthma? 0      Patient has a Pap smear 1 year ago.  She is scheduled for another OB/GYN appointment in 1 month.      How many days per week do you miss taking your asthma controller medication?  I do not have an asthma controller medication  Please describe any recent triggers for your asthma: mold, strong odors and fumes, and exercise or sports  Have you had any Emergency Room Visits, Urgent Care Visits, or Hospital Admissions since your last office visit?  No  Migraine   Since your last clinic visit, how have your headaches changed?  No change  How often are you getting headaches or migraines? One a month   Are you able to do normal daily  activities when you have a migraine? No  Are you taking rescue/relief medications? (Select all that apply) sumatriptan (Imitrex)  How helpful is your rescue/relief medication?  I get no relief  Are you taking any medications to prevent migraines? (Select all that apply)  No  In the past 4 weeks, how often have you gone to urgent care or the emergency room because of your headaches?  0    Chronic pain on the right side of the neck, radiating to the shoulder and upper back. Pain is daily. On the scale of 4/10. Constant pain, sometimes it increases to the level 8/10. Patient reports cannoning, moving boxes, vacuuming, exercising. Patient tried physical therapy and massage therapy.   Desires to try a muscle relaxant.     Had Covid last year and felt that she needed an inhaler for shortness of breath. Feels chest tightness, wheezing (induced by dust, mold or exercise).desires to refill her old prescription for albuterol.     Migraines - increased in frequency since 2020. Complains of migraine headache once a month, they last for one week.  She has a well established history of recurrent migraines.  Vision changes leading up to the migraines. Right eye and occasionally in the left eye. Reports feeling blurry vision and sensitivity to light during the migraines.   Patient wears contacts. Last evaluation was 4 months ago.   Description of pain: sharp pain, squeezing pain, unilateral in the right frontal area.  Associated symptoms: nausea, vomiting, photophobia, sonophobia, and auras described as blurry vision.  Patient tried sumatriptan in the past without significant improvement.   There are no associated abnormal neurological symptoms such as TIA's, loss of balance, loss of vision or speech, numbness or weakness on review. Past neurological history: negative for stroke, MS, epilepsy, or brain tumor.     Review of Systems   Constitutional, HEENT, cardiovascular, pulmonary, gi and gu systems are negative, except as  "otherwise noted.      Objective    /79 (BP Location: Right arm, Patient Position: Sitting, Cuff Size: Adult Regular)   Pulse 93   Temp 98.4  F (36.9  C) (Oral)   Resp 16   Ht 1.693 m (5' 6.65\")   Wt 64.4 kg (142 lb)   SpO2 98%   BMI 22.47 kg/m    Body mass index is 22.47 kg/m .  Physical Exam   GENERAL: healthy, alert and no distress  NECK: no adenopathy, no asymmetry, masses, or scars and thyroid normal to palpation  RESP: lungs clear to auscultation - no rales, rhonchi or wheezes  CV: regular rate and rhythm, normal S1 S2, no S3 or S4, no murmur, click or rub, no peripheral edema and peripheral pulses strong  ABDOMEN: soft, nontender, no hepatosplenomegaly, no masses and bowel sounds normal  MS: no gross musculoskeletal defects noted, no edema                "

## 2023-11-14 ASSESSMENT — ASTHMA QUESTIONNAIRES: ACT_TOTALSCORE: 19

## 2023-11-29 ENCOUNTER — PRE VISIT (OUTPATIENT)
Dept: ORTHOPEDICS | Facility: CLINIC | Age: 33
End: 2023-11-29

## 2023-11-29 ENCOUNTER — OFFICE VISIT (OUTPATIENT)
Dept: ORTHOPEDICS | Facility: CLINIC | Age: 33
End: 2023-11-29
Payer: COMMERCIAL

## 2023-11-29 DIAGNOSIS — M92.72 FREIBERG'S INFRACTION, LEFT: Primary | ICD-10-CM

## 2023-11-29 DIAGNOSIS — M79.672 LEFT FOOT PAIN: ICD-10-CM

## 2023-11-29 PROCEDURE — 99205 OFFICE O/P NEW HI 60 MIN: CPT | Performed by: PODIATRIST

## 2023-11-29 NOTE — PROGRESS NOTES
Date of Service: 11/29/2023    Chief Complaint:   Chief Complaint   Patient presents with    Consult     Patient relates that she was diagnosed with AVS in her left foot last spring. Prescribed bone stimulator. Patient relates that she was using it on and off for 3 months. Patient relates that it was a big time commitment as she was not able to do this.  PRP injections made it worse. Calcium and vitamin D. Diet and minimal drinking. Acupuncture. Sleeps with foot elevated. Patient is interested on Dr. Null's thoughts on bone stimulater.  Co Hyperbaric oxygen therapy. Referral for physical therapy.         HPI: Melia is a 33 year old female who presents today for further evaluation of left foot pain. She has a significant history with this left foot.  She relates that while she was in college, many years ago, the foot was stepped on.  She has had stress fractures in this foot in the metatarsals.  She relates that she was having pain in the foot and went to see podiatry in [place].  They noted on x-ray and MRI that she likely had Freiberg's infraction.  Unusually, this was in multiple metatarsals.  She relates that since then, she is tried many conservative therapies.  She has tried PRP injections, steroid injections, orthotics, and a bone stimulator.  She notes that she was told to use a bone stimulator 3 hours a day.  Logistically, this is very difficult.  She notes that the orthotics have helped to reduce some of her pain.  She is active in Pilates, although she has to modify many of the activities that she does.  She is not quite ready for surgery on the metatarsals.  She is wondering if there are any new treatment options for this.    Review of Systems: No nausea, vomiting, diarrhea, fever, chills, night sweats, shortness of breath, chest pain.    PMH: No past medical history on file.    PSxH:   Past Surgical History:   Procedure Laterality Date    BREAST SURGERY      paraclavicular thoracic outlet  decompression Right 11/2020       Allergies: Patient has no known allergies.    SH:   Social History     Socioeconomic History    Marital status:      Spouse name: Not on file    Number of children: Not on file    Years of education: Not on file    Highest education level: Not on file   Occupational History    Not on file   Tobacco Use    Smoking status: Never     Passive exposure: Never    Smokeless tobacco: Never   Substance and Sexual Activity    Alcohol use: Yes     Alcohol/week: 0.0 standard drinks of alcohol     Comment: 5-6 drinks per week    Drug use: No    Sexual activity: Yes     Birth control/protection: I.U.D.   Other Topics Concern    Parent/sibling w/ CABG, MI or angioplasty before 65F 55M? Not Asked   Social History Narrative    Not on file     Social Determinants of Health     Financial Resource Strain: Low Risk  (11/8/2023)    Financial Resource Strain     Within the past 12 months, have you or your family members you live with been unable to get utilities (heat, electricity) when it was really needed?: No   Food Insecurity: Low Risk  (11/8/2023)    Food Insecurity     Within the past 12 months, did you worry that your food would run out before you got money to buy more?: No     Within the past 12 months, did the food you bought just not last and you didn t have money to get more?: No   Transportation Needs: Low Risk  (11/8/2023)    Transportation Needs     Within the past 12 months, has lack of transportation kept you from medical appointments, getting your medicines, non-medical meetings or appointments, work, or from getting things that you need?: No   Physical Activity: Not on file   Stress: Not on file   Social Connections: Not on file   Interpersonal Safety: Low Risk  (11/13/2023)    Interpersonal Safety     Do you feel physically and emotionally safe where you currently live?: Yes     Within the past 12 months, have you been hit, slapped, kicked or otherwise physically hurt by  someone?: No     Within the past 12 months, have you been humiliated or emotionally abused in other ways by your partner or ex-partner?: No   Housing Stability: Low Risk  (11/8/2023)    Housing Stability     Do you have housing? : Yes     Are you worried about losing your housing?: No       FH:   Family History   Problem Relation Age of Onset    Coronary Artery Disease No family hx of     Cerebrovascular Disease No family hx of        Objective:  Data Unavailable Data Unavailable Data Unavailable Data Unavailable Data Unavailable 0 lbs 0 oz    PT and DP pulses are 2/4 bilaterally. CRT is instant.  Positive pedal hair.   Gross sensation is intact bilaterally.  Some subjective loss of protective sensation in the plantar metatarsal heads on the left side.  Equinus is mild bilaterally.  Pain noted today with palpation of all the left lesser metatarsal heads along with the interspaces.  Some pain noted with palpation of the fibular sesamoid.  Pain noted with range of motion and distraction of the lesser MTPJ's.  No edema or ecchymosis noted.  Nails normal bilaterally. No open lesions are noted.     No x-rays indicated during today's visit  Previous films were reviewed today, independent visualization of images was performed, and results were discussed with the patient      Assessment:   Encounter Diagnoses   Name Primary?    Freiberg's infraction, left Yes    Left foot pain          Plan:  - Pt seen and evaluated.  -She had x-rays done yesterday at [facility].  I did review these.  -I did do an extensive chart review.  -She would like to know if there are any new clinical trials for this.  I did search the NIH database and could not find any.  I also did a pub med search and up-to-date search to see if there are any new treatments for Freiberg's.  Unfortunately, I cannot find any.  She would like to know if she can restart the bone stimulator.  Although I do not think it would do much, we can restart this.  I will send in a  prescription to physio med.  -She would like a new pair of orthotics.  These were molded and sent to the lab.  -Activity as tolerated.  -I did discuss with her that because she does not have full collapse of the metatarsal head, she might do better in surgery.  If she ever is interested in this, she can send me a MyChart and I will send her to see one of my surgical colleagues.  -See again as needed.  On the date of service has been 60 minutes with patient care, documentation, chart review, image review, care coordination, and extensive review of information.

## 2023-11-29 NOTE — LETTER
11/29/2023         RE: Melia Braxton  [address]  [place] [address]        Dear Colleague,    Thank you for referring your patient, Melia Braxton, to the [facility]. Please see a copy of my visit note below.    Date of Service: 11/29/2023    Chief Complaint:   Chief Complaint   Patient presents with    Consult     Patient relates that she was diagnosed with AVS in her left foot last spring. Prescribed bone stimulator. Patient relates that she was using it on and off for 3 months. Patient relates that it was a big time commitment as she was not able to do this.  PRP injections made it worse. Calcium and vitamin D. Diet and minimal drinking. Acupuncture. Sleeps with foot elevated. Patient is interested on Dr. Null's thoughts on bone stimulater.  Co Hyperbaric oxygen therapy. Referral for physical therapy.         HPI: Melia is a 33 year old female who presents today for further evaluation of left foot pain. She has a significant history with this left foot.  She relates that while she was in college, many years ago, the foot was stepped on.  She has had stress fractures in this foot in the metatarsals.  She relates that she was having pain in the foot and went to see podiatry in [place].  They noted on x-ray and MRI that she likely had Freiberg's infraction.  Unusually, this was in multiple metatarsals.  She relates that since then, she is tried many conservative therapies.  She has tried PRP injections, steroid injections, orthotics, and a bone stimulator.  She notes that she was told to use a bone stimulator 3 hours a day.  Logistically, this is very difficult.  She notes that the orthotics have helped to reduce some of her pain.  She is active in [employer], although she has to modify many of the activities that she does.  She is not quite ready for surgery on the metatarsals.  She is wondering if there are any new treatment options for this.    Review of  Systems: No nausea, vomiting, diarrhea, fever, chills, night sweats, shortness of breath, chest pain.    PMH: No past medical history on file.    PSxH:   Past Surgical History:   Procedure Laterality Date    BREAST SURGERY      paraclavicular thoracic outlet decompression Right 11/2020       Allergies: Patient has no known allergies.    SH:   Social History     Socioeconomic History    Marital status:      Spouse name: Not on file    Number of children: Not on file    Years of education: Not on file    Highest education level: Not on file   Occupational History    Not on file   Tobacco Use    Smoking status: Never     Passive exposure: Never    Smokeless tobacco: Never   Substance and Sexual Activity    Alcohol use: Yes     Alcohol/week: 0.0 standard drinks of alcohol     Comment: 5-6 drinks per week    Drug use: No    Sexual activity: Yes     Birth control/protection: I.U.D.   Other Topics Concern    Parent/sibling w/ CABG, MI or angioplasty before 65F 55M? Not Asked   Social History Narrative    Not on file     Social Determinants of Health     Financial Resource Strain: Low Risk  (11/8/2023)    Financial Resource Strain     Within the past 12 months, have you or your family members you live with been unable to get utilities (heat, electricity) when it was really needed?: No   Food Insecurity: Low Risk  (11/8/2023)    Food Insecurity     Within the past 12 months, did you worry that your food would run out before you got money to buy more?: No     Within the past 12 months, did the food you bought just not last and you didn t have money to get more?: No   Transportation Needs: Low Risk  (11/8/2023)    Transportation Needs     Within the past 12 months, has lack of transportation kept you from medical appointments, getting your medicines, non-medical meetings or appointments, work, or from getting things that you need?: No   Physical Activity: Not on file   Stress: Not on file   Social Connections: Not on  file   Interpersonal Safety: Low Risk  (11/13/2023)    Interpersonal Safety     Do you feel physically and emotionally safe where you currently live?: Yes     Within the past 12 months, have you been hit, slapped, kicked or otherwise physically hurt by someone?: No     Within the past 12 months, have you been humiliated or emotionally abused in other ways by your partner or ex-partner?: No   Housing Stability: Low Risk  (11/8/2023)    Housing Stability     Do you have housing? : Yes     Are you worried about losing your housing?: No       FH:   Family History   Problem Relation Age of Onset    Coronary Artery Disease No family hx of     Cerebrovascular Disease No family hx of        Objective:  Data Unavailable Data Unavailable Data Unavailable Data Unavailable Data Unavailable 0 lbs 0 oz    PT and DP pulses are 2/4 bilaterally. CRT is instant.  Positive pedal hair.   Gross sensation is intact bilaterally.  Some subjective loss of protective sensation in the plantar metatarsal heads on the left side.  Equinus is mild bilaterally.  Pain noted today with palpation of all the left lesser metatarsal heads along with the interspaces.  Some pain noted with palpation of the fibular sesamoid.  Pain noted with range of motion and distraction of the lesser MTPJ's.  No edema or ecchymosis noted.  Nails normal bilaterally. No open lesions are noted.     No x-rays indicated during today's visit  Previous films were reviewed today, independent visualization of images was performed, and results were discussed with the patient      Assessment:   Encounter Diagnoses   Name Primary?    Freiberg's infraction, left Yes    Left foot pain          Plan:  - Pt seen and evaluated.  -She had x-rays done yesterday at [facility].  I did review these.  -I did do an extensive chart review.  -She would like to know if there are any new clinical trials for this.  I did search the [employer] database and could not find any.  I also did a pub med search and  up-to-date search to see if there are any new treatments for Jayantiberjasmyne's.  Unfortunately, I cannot find any.  She would like to know if she can restart the bone stimulator.  Although I do not think it would do much, we can restart this.  I will send in a prescription to physio [facility].  -She would like a new pair of orthotics.  These were molded and sent to the lab.  -Activity as tolerated.  -I did discuss with her that because she does not have full collapse of the metatarsal head, she might do better in surgery.  If she ever is interested in this, she can send me a MyChart and I will send her to see one of my surgical colleagues.  -See again as needed.  On the date of service has been 60 minutes with patient care, documentation, chart review, image review, care coordination, and extensive review of information.           Bo Null DPM

## 2023-12-04 ASSESSMENT — ANXIETY QUESTIONNAIRES
2. NOT BEING ABLE TO STOP OR CONTROL WORRYING: NEARLY EVERY DAY
GAD7 TOTAL SCORE: 17
IF YOU CHECKED OFF ANY PROBLEMS ON THIS QUESTIONNAIRE, HOW DIFFICULT HAVE THESE PROBLEMS MADE IT FOR YOU TO DO YOUR WORK, TAKE CARE OF THINGS AT HOME, OR GET ALONG WITH OTHER PEOPLE: SOMEWHAT DIFFICULT
1. FEELING NERVOUS, ANXIOUS, OR ON EDGE: NEARLY EVERY DAY
3. WORRYING TOO MUCH ABOUT DIFFERENT THINGS: NEARLY EVERY DAY
5. BEING SO RESTLESS THAT IT IS HARD TO SIT STILL: NEARLY EVERY DAY
GAD7 TOTAL SCORE: 17
4. TROUBLE RELAXING: NEARLY EVERY DAY
6. BECOMING EASILY ANNOYED OR IRRITABLE: SEVERAL DAYS
7. FEELING AFRAID AS IF SOMETHING AWFUL MIGHT HAPPEN: SEVERAL DAYS

## 2023-12-04 ASSESSMENT — PAIN SCALES - PAIN ENJOYMENT GENERAL ACTIVITY SCALE (PEG)
PEG_TOTALSCORE: 6
INTERFERED_ENJOYMENT_LIFE: 8
INTERFERED_GENERAL_ACTIVITY: 6
AVG_PAIN_PASTWEEK: 4

## 2023-12-08 ENCOUNTER — TELEPHONE (OUTPATIENT)
Dept: ANESTHESIOLOGY | Facility: CLINIC | Age: 33
End: 2023-12-08
Payer: COMMERCIAL

## 2023-12-08 NOTE — TELEPHONE ENCOUNTER
I called the patient and left an message to remind her of her appointment With Chantelle Marrero at  8:00 to arrive 15-20 minutes prior

## 2023-12-11 ENCOUNTER — OFFICE VISIT (OUTPATIENT)
Dept: ANESTHESIOLOGY | Facility: CLINIC | Age: 33
End: 2023-12-11
Payer: COMMERCIAL

## 2023-12-11 VITALS — SYSTOLIC BLOOD PRESSURE: 118 MMHG | HEART RATE: 104 BPM | OXYGEN SATURATION: 100 % | DIASTOLIC BLOOD PRESSURE: 83 MMHG

## 2023-12-11 DIAGNOSIS — R07.89 ANTERIOR CHEST WALL PAIN: ICD-10-CM

## 2023-12-11 DIAGNOSIS — M25.511 CHRONIC RIGHT SHOULDER PAIN: ICD-10-CM

## 2023-12-11 DIAGNOSIS — M54.2 NECK PAIN ON RIGHT SIDE: Primary | ICD-10-CM

## 2023-12-11 DIAGNOSIS — G89.29 CHRONIC RIGHT SHOULDER PAIN: ICD-10-CM

## 2023-12-11 DIAGNOSIS — M79.18 MYOFASCIAL PAIN: ICD-10-CM

## 2023-12-11 DIAGNOSIS — Z86.69 HISTORY OF THORACIC OUTLET SYNDROME: ICD-10-CM

## 2023-12-11 PROCEDURE — 99205 OFFICE O/P NEW HI 60 MIN: CPT

## 2023-12-11 RX ORDER — METHOCARBAMOL 500 MG/1
500-1000 TABLET, FILM COATED ORAL 4 TIMES DAILY PRN
Qty: 60 TABLET | Refills: 1 | Status: SHIPPED | OUTPATIENT
Start: 2023-12-11

## 2023-12-11 ASSESSMENT — PAIN SCALES - GENERAL: PAINLEVEL: MODERATE PAIN (5)

## 2023-12-11 NOTE — PROGRESS NOTES
[facility]     Date of visit: 12/11/2023    Assessment:  Melia Braxton is a 33 year old female with a past medical history significant for Freiberg's disease (left), hx anxiety/depression, thoracic outlet syndrome, s/p right TOS decompression (2020), who presents with complaints of right sided neck/shoulder/upper back and right anterior upper chest.     Right sided neck/upper back/anterior chest - Onset of current pain symptoms following thoracic outlet decompression surgery in 2020. Describes pain as constant aching in shoulder and periscapular area, burning quality in medial trapezius and into right side of neck, cramping and sharp along medial scapular border, with fluctuating pain intensity. Intermittent burning radiates into right side of head/temple, intermittent radicular pain into upper arm when pain is severe. Pain worsens with activities that engage use of arms, notes symptoms increase when arms are raised shoulder level or above. She uses a variety of devices at home that target myofascial pain/trigger points and cervical traction, as well as stretching and heat. She reports some degree of benefit with these tools, but benefit not sustained. No prior injections, limited medication trials, never used TENS unit. On exam, BUE strength and sensory intact, cervical ROM is WNL, right sided cervical, trapezius, periscapular myofascial TTP. Etiology is not entirely clear, though seems most consistent with myofascial process, hx of TOS and s/p decompression surgery.     Assigned to [facility] nursing team.     Visit diagnoses:   1. Neck pain on right side    2. History of thoracic outlet syndrome    3. Chronic right shoulder pain    4. Myofascial pain    5. Anterior chest wall pain        Plan:  The following recommendations were given to the patient. Diagnosis, treatment options, risks, benefits, and alternatives were discussed, and all questions were answered. The patient expressed  understanding of the plan for management.     I am recommending a multidisciplinary treatment plan to help this patient better manage her pain.  This includes:     Pain Physical Therapy:  NO   -  Completed PT in the past, currently engaged in one on one [facility] [place]     Pain Psychologist to address relaxation, behavioral change, coping style, and other factors important to improvement.  NO    Diagnostic Studies:  No recent cervical imaging. May consider in future if indicated.     Medication Management:   Flexeril recently ordered by PCP, though she was not able to  from pharmacy. Recommend alternative - start methocarbamol 500-1000 mg up to four times daily as needed. Advised to start with 1 tab (500 mg) and increase to 2 tabs (1000 mg) as tolerated. Monitor for improvement in pain level/intensity.   Monitor for sedation - may cause dizziness or drowsiness. Be careful driving/moving around until you know effects of medication. Avoid concurrent alcohol use.     Potential procedures:   Recommend right sided cervical and periscapular TPI. Advised she will schedule with physician partner in clinic for injections.     Other Orders/Referrals:   TENS unit - DME order provided today. Recommend starting with lowest intensity and increase as tolerated.     Follow up with BILLIE Watson CNP in 8 week or sooner if needed      Review of Electronic Chart: Today I have also reviewed available medical information in the patient's medical record at [facility] ([facility]) and Care Everywhere (if available), including relevant provider notes, laboratory work, and imaging.     Chantelle Farrar DNP, BILLIE, AGNP-C  [facility]         -------------------------------------------------------------------    Subjective     Reason for consultation:    Melia Braxton is a 33 year old female who is seen in consultation today at the request of PCP Kelsey Rodriguez MD for evaluation of her pain issues and  recommendations for management, with specific emphasis on  History of thoracic outlet syndrome [Z86.69]   Reason for Referral:Consult Only - One Time Evaluation Provider, please review opioid agreement in the process instructions above. Do you agree to these terms?Yes Scheduling Instructions:[facility] will call you to coordinate care as prescribed your provider. If you don t hear from a representative within 2 business days, please call [phone]. Additional Information:chronic pain on the right neck, shoulder and upper back.    Please see the [facility] health questionnaire which the patient completed and reviewed with me in detail (if available).     Review of [place] Prescription Monitoring Program (): No concern for abuse or misuse of controlled medications based on this report. Reviewed - lorazepam #10 tabs on 12/1/23, zolpidem in July     Review of Electronic Chart: Today I have also reviewed available medical information in the patient's medical record at  [facility] ([facility]), including relevant provider notes, laboratory work, and imaging.     Pain medications are being prescribed by N/A.     Chief Complaint:    Chief Complaint   Patient presents with    Consult     Consult only         HPI:     Melia Braxton is a 33 year old female presents with a chief complaint of right sided neck and shoulder, anterior upper chest wall pain, intermittent radiating pain into upper arm (does not extend into lower arm/pain).     The pain has been present for since TOS decompression surgery in 2020 .    The pain is Moderate Pain (5) in severity.    The pain is described as constant aching in shoulder and periscapular area, burning quality in medial trap and into right side of neck, cramping and sharp along medial scapular border, fluctuating intensity. Intermittent burning radiates into right side of head/temple, intermittent radicular pain into upper arm when pain is severe.  "  The pain is alleviated by stretching, theragun massage device, \"The Knuckle\" device that lay (sounds like cervical traction, states she saw online), heat, spine fitter acupressure trigger point tool, cervical spine roller (very helpful), foam roller.    It is exacerbated by vacuuming house, blowing dry hair. Household chores, activities that really engage arms. Pain is worse in the morning after sleeping.   Modalities that have been utilized in the past which were helpful include PT ([facility] T[facility] - helpful to some extent but benefit not sustained), awkward positioning with neck (while sitting on couch, lying in bed, working at computer).    Things that were not helpful, but tried ,include PT (no sustained benefit).    The patient has never tried Botox, medications, TENS unit.  Melia Braxton has not been seen at a pain clinic in the past.      -She had onset of migraine in 2020 that lasted 3 months, saw neurology and was dx with thoracic outlet (vascular). She then had surgery and onset of right anterior upper chest wall, shoulder and neck pain.   -She had PT and surgery here in , then moved to [place] for 2 years while  was in grad school in 2021, then moved back here a few months ago.   -She is doing one on one pilates  -She lives with , no children yet.   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       Pain Questionnaire    What number best describes your pain right now: 5  (0 = No pain to 10 = Worst pain imaginable)    How would you describe the pain? burning, cramping, sharp, dull, aching    Which of the following worsen your pain? lying down, standing, sitting, walking, exercise, coughing / sneezing    Which of the following improve or reduce your pain? walking    What number best describes your average pain for the past week: 7  (0 = No pain to 10 = Worst pain imaginable)    What number best describes your LOWEST pain in past 24 hours: 4  (0 = No pain to 10 " = Worst pain imaginable)    What number best describes your WORST pain in past 24 hours: 5  (0 = No pain to 10 = Worst pain imaginable)    When is your pain worst? Constant    What non-medicine treatments have you already had for your pain? physical therapy, acupuncture, counseling, exercise    Have you tried treating your pain with medication? No      Current Pain Treatments:    Medications:    Rizatriptan PRN   Methocarbamol 500-1000 mg QID PRN    2. Other therapies:    TPI   TENS      Current Outpatient Medications   Medication    albuterol (PROAIR HFA/PROVENTIL HFA/VENTOLIN HFA) 108 (90 Base) MCG/ACT inhaler    ALPRAZolam (XANAX) 0.5 MG tablet    cyclobenzaprine (FLEXERIL) 5 MG tablet    methocarbamol (ROBAXIN) 500 MG tablet    methylphenidate (RITALIN) 10 MG tablet    paragard intrauterine copper    rizatriptan (MAXALT) 10 MG tablet     No current facility-administered medications for this visit.     No Known Allergies   No past medical history on file.  Past Surgical History:   Procedure Laterality Date    BREAST SURGERY      paraclavicular thoracic outlet decompression Right 11/2020     Family History   Problem Relation Age of Onset    Coronary Artery Disease No family hx of     Cerebrovascular Disease No family hx of      Social History     Socioeconomic History    Marital status:      Spouse name: None    Number of children: None    Years of education: None    Highest education level: None   Tobacco Use    Smoking status: Never     Passive exposure: Never    Smokeless tobacco: Never   Substance and Sexual Activity    Alcohol use: Yes     Alcohol/week: 0.0 standard drinks of alcohol     Comment: 5-6 drinks per week    Drug use: No    Sexual activity: Yes     Birth control/protection: I.U.D.     Social Determinants of Health     Financial Resource Strain: Low Risk  (11/8/2023)    Financial Resource Strain     Within the past 12 months, have you or your family members you live with been unable to get  utilities (heat, electricity) when it was really needed?: No   Food Insecurity: Low Risk  (11/8/2023)    Food Insecurity     Within the past 12 months, did you worry that your food would run out before you got money to buy more?: No     Within the past 12 months, did the food you bought just not last and you didn t have money to get more?: No   Transportation Needs: Low Risk  (11/8/2023)    Transportation Needs     Within the past 12 months, has lack of transportation kept you from medical appointments, getting your medicines, non-medical meetings or appointments, work, or from getting things that you need?: No   Interpersonal Safety: Low Risk  (11/13/2023)    Interpersonal Safety     Do you feel physically and emotionally safe where you currently live?: Yes     Within the past 12 months, have you been hit, slapped, kicked or otherwise physically hurt by someone?: No     Within the past 12 months, have you been humiliated or emotionally abused in other ways by your partner or ex-partner?: No   Housing Stability: Low Risk  (11/8/2023)    Housing Stability     Do you have housing? : Yes     Are you worried about losing your housing?: No      ROS: 10 point ROS neg other than the symptoms noted above in the HPI.      Objective      Diagnostic Testing - Imaging/Labs:    Labs:    CBC and CMP 11/13/23 - WNL    Imaging:   None       Physical Exam  HENT:      Head: Normocephalic.   Pulmonary:      Effort: Pulmonary effort is normal.   Musculoskeletal:      Comments: See below.    Neurological:      Mental Status: She is alert.      Comments: See below.    Psychiatric:         Mood and Affect: Mood normal.         Musculoskeletal exam:  Gait intact.   Normal bulk and tone. Unremarkable spinal curvature.     Cervical spine:  Range of motion within normal limits   Tenderness in the cervical paraspinal muscles.Yes - right sided  Rotation/ext to right: pain free  Rotation/ext to left: pain free    Thoracic spine:    Kyphosis.  No   Tenderness in the thoracic paraspinal muscles.Yes - right sided, periscapular    Neurologic exam:  CN:  Cranial nerves 2-12 are grossly intact  Motor:  5/5 UE strength            Reflexes:     Biceps:     R:  2/4 L: 2/4   Brachioradialis   R:  2/4 L: 2/4    Sensory:   Light touch: normal bilateral upper extremities    No allodynia, dysesthesia, or hyperalgesia.        BILLING TIME DOCUMENTATION:   The total TIME spent on this patient on the date of the encounter/appointment was 70 minutes.      TOTAL TIME includes:   Time spent preparing to see the patient (reviewing records and tests)   Time spent face to face (or over the phone) with the patient   Time spent ordering tests, medications, procedures and referrals   Time spent Referring and communicating with other healthcare professionals   Time spent documenting clinical information in Epic

## 2023-12-11 NOTE — LETTER
12/11/2023       RE: Melia Braxton  [address]  [place] [address]     Dear Colleague,    Thank you for referring your patient, Melia Braxton, to the [facility] FOR COM[facility]HENSIVE PAIN MANAGEMENT [place] at [facility]. Please see a copy of my visit note below.      [facility]     Date of visit: 12/11/2023    Assessment:  Melia Braxton is a 33 year old female with a past medical history significant for Freiberg's disease (left), hx anxiety/depression, thoracic outlet syndrome, s/p right TOS decompression (2020), who presents with complaints of right sided neck/shoulder/upper back and right anterior upper chest.     Right sided neck/upper back/anterior chest - Onset of current pain symptoms following thoracic outlet decompression surgery in 2020. Describes pain as constant aching in shoulder and periscapular area, burning quality in medial trapezius and into right side of neck, cramping and sharp along medial scapular border, with fluctuating pain intensity. Intermittent burning radiates into right side of head/temple, intermittent radicular pain into upper arm when pain is severe. Pain worsens with activities that engage use of arms, notes symptoms increase when arms are raised shoulder level or above. She uses a variety of devices at home that target myofascial pain/trigger points and cervical traction, as well as stretching and heat. She reports some degree of benefit with these tools, but benefit not sustained. No prior injections, limited medication trials, never used TENS unit. On exam, BUE strength and sensory intact, cervical ROM is WNL, right sided cervical, trapezius, periscapular myofascial TTP. Etiology is not entirely clear, though seems most consistent with myofascial process, hx of TOS and s/p decompression surgery.     Assigned to [facility] team.     Visit diagnoses:   1. Neck pain on right side    2.  History of thoracic outlet syndrome    3. Chronic right shoulder pain    4. Myofascial pain    5. Anterior chest wall pain        Plan:  The following recommendations were given to the patient. Diagnosis, treatment options, risks, benefits, and alternatives were discussed, and all questions were answered. The patient expressed understanding of the plan for management.     I am recommending a multidisciplinary treatment plan to help this patient better manage her pain.  This includes:     Pain Physical Therapy:  NO   -  Completed PT in the past, currently engaged in one on one \[facility] [place] [facility]\""ates     Pain Psychologist to address relaxation, behavioral change, coping style, and other factors important to improvement.  NO    Diagnostic Studies:  No recent cervical imaging. May consider in future if indicated.     Medication Management:   Flexeril recently ordered by PCP, though she was not able to  from pharmacy. Recommend alternative - start methocarbamol 500-1000 mg up to four times daily as needed. Advised to start with 1 tab (500 mg) and increase to 2 tabs (1000 mg) as tolerated. Monitor for improvement in pain level/intensity.   Monitor for sedation - may cause dizziness or drowsiness. Be careful driving/moving around until you know effects of medication. Avoid concurrent alcohol use.     Potential procedures:   Recommend right sided cervical and periscapular TPI. Advised she will schedule with physician partner in clinic for injections.     Other Orders/Referrals:   TENS unit - DME order provided today. Recommend starting with lowest intensity and increase as tolerated.     Follow up with BILLIE Watson CNP in 8 week or sooner if needed      Review of Electronic Chart: Today I have also reviewed available medical information in the patient's medical record at [facility] ([facility]) and Care Everywhere (if available), including relevant provider notes, laboratory work, and imaging.     Chantelle Farrar DNP, APRN,  AGNP-C  [facility]         -------------------------------------------------------------------    Subjective     Reason for consultation:    Melia Braxton is a 33 year old female who is seen in consultation today at the request of PCP Kelsey Rodriguez MD for evaluation of her pain issues and recommendations for management, with specific emphasis on  History of thoracic outlet syndrome [Z86.69]   Reason for Referral:Consult Only - One Time Evaluation Provider, please review opioid agreement in the process instructions above. Do you agree to these terms?Yes Scheduling Instructions:[employer] will call you to coordinate care as prescribed your provider. If you don t hear from a representative within 2 business days, please call [phone]. Additional Information:chronic pain on the right neck, shoulder and upper back.    Please see the [facility] health questionnaire which the patient completed and reviewed with me in detail (if available).     Review of [place] Prescription Monitoring Program (): No concern for abuse or misuse of controlled medications based on this report. Reviewed - lorazepam #10 tabs on 12/1/23, zolpidem in July     Review of Electronic Chart: Today I have also reviewed available medical information in the patient's medical record at [facility] ([facility]), including relevant provider notes, laboratory work, and imaging.     Pain medications are being prescribed by N/A.     Chief Complaint:    Chief Complaint   Patient presents with    Consult     Consult only         HPI:     Melia Braxton is a 33 year old female presents with a chief complaint of right sided neck and shoulder, anterior upper chest wall pain, intermittent radiating pain into upper arm (does not extend into lower arm/pain).     The pain has been present for since TOS decompression surgery in 2020 .    The pain is Moderate Pain (5) in severity.    The pain is  "described as constant aching in shoulder and periscapular area, burning quality in medial trap and into right side of neck, cramping and sharp along medial scapular border, fluctuating intensity. Intermittent burning radiates into right side of head/temple, intermittent radicular pain into upper arm when pain is severe.   The pain is alleviated by stretching, theragun massage device, \"The Knuckle\" device that lay (sounds like cervical traction, states she saw online), heat, spine fitter acupressure trigger point tool, cervical spine roller (very helpful), foam roller.    It is exacerbated by vacuuming house, blowing dry hair. Household chores, activities that really engage arms. Pain is worse in the morning after sleeping.   Modalities that have been utilized in the past which were helpful include PT ([facility] T[facility] - helpful to some extent but benefit not sustained), awkward positioning with neck (while sitting on couch, lying in bed, working at computer).    Things that were not helpful, but tried ,include PT (no sustained benefit).    The patient has never tried Botox, medications, TENS unit.  Melia Braxton has not been seen at a pain clinic in the past.      -She had onset of migraine in 2020 that lasted 3 months, saw neurology and was dx with thoracic outlet (vascular). She then had surgery and onset of right anterior upper chest wall, shoulder and neck pain.   -She had PT and surgery here in , then moved to [place] for 2 years while  was in grad school in 2021, then moved back here a few months ago.   -She is doing one on one pilates  -She lives with , no children yet.   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       Pain Questionnaire    What number best describes your pain right now: 5  (0 = No pain to 10 = Worst pain imaginable)    How would you describe the pain? burning, cramping, sharp, dull, aching    Which of the following worsen your pain? lying down, " standing, sitting, walking, exercise, coughing / sneezing    Which of the following improve or reduce your pain? walking    What number best describes your average pain for the past week: 7  (0 = No pain to 10 = Worst pain imaginable)    What number best describes your LOWEST pain in past 24 hours: 4  (0 = No pain to 10 = Worst pain imaginable)    What number best describes your WORST pain in past 24 hours: 5  (0 = No pain to 10 = Worst pain imaginable)    When is your pain worst? Constant    What non-medicine treatments have you already had for your pain? physical therapy, acupuncture, counseling, exercise    Have you tried treating your pain with medication? No      Current Pain Treatments:    Medications:    Rizatriptan PRN   Methocarbamol 500-1000 mg QID PRN    2. Other therapies:    TPI   TENS      Current Outpatient Medications   Medication    albuterol (PROAIR HFA/PROVENTIL HFA/VENTOLIN HFA) 108 (90 Base) MCG/ACT inhaler    ALPRAZolam (XANAX) 0.5 MG tablet    cyclobenzaprine (FLEXERIL) 5 MG tablet    methocarbamol (ROBAXIN) 500 MG tablet    methylphenidate (RITALIN) 10 MG tablet    paragard intrauterine copper    rizatriptan (MAXALT) 10 MG tablet     No current facility-administered medications for this visit.     No Known Allergies   No past medical history on file.  Past Surgical History:   Procedure Laterality Date    BREAST SURGERY      paraclavicular thoracic outlet decompression Right 11/2020     Family History   Problem Relation Age of Onset    Coronary Artery Disease No family hx of     Cerebrovascular Disease No family hx of      Social History     Socioeconomic History    Marital status:      Spouse name: None    Number of children: None    Years of education: None    Highest education level: None   Tobacco Use    Smoking status: Never     Passive exposure: Never    Smokeless tobacco: Never   Substance and Sexual Activity    Alcohol use: Yes     Alcohol/week: 0.0 standard drinks of alcohol      Comment: 5-6 drinks per week    Drug use: No    Sexual activity: Yes     Birth control/protection: I.U.D.     Social Determinants of Health     Financial Resource Strain: Low Risk  (11/8/2023)    Financial Resource Strain     Within the past 12 months, have you or your family members you live with been unable to get utilities (heat, electricity) when it was really needed?: No   Food Insecurity: Low Risk  (11/8/2023)    Food Insecurity     Within the past 12 months, did you worry that your food would run out before you got money to buy more?: No     Within the past 12 months, did the food you bought just not last and you didn t have money to get more?: No   Transportation Needs: Low Risk  (11/8/2023)    Transportation Needs     Within the past 12 months, has lack of transportation kept you from medical appointments, getting your medicines, non-medical meetings or appointments, work, or from getting things that you need?: No   Interpersonal Safety: Low Risk  (11/13/2023)    Interpersonal Safety     Do you feel physically and emotionally safe where you currently live?: Yes     Within the past 12 months, have you been hit, slapped, kicked or otherwise physically hurt by someone?: No     Within the past 12 months, have you been humiliated or emotionally abused in other ways by your partner or ex-partner?: No   Housing Stability: Low Risk  (11/8/2023)    Housing Stability     Do you have housing? : Yes     Are you worried about losing your housing?: No      ROS: 10 point ROS neg other than the symptoms noted above in the HPI.      Objective      Diagnostic Testing - Imaging/Labs:    Labs:    CBC and CMP 11/13/23 - WNL    Imaging:   None       Physical Exam  HENT:      Head: Normocephalic.   Pulmonary:      Effort: Pulmonary effort is normal.   Musculoskeletal:      Comments: See below.    Neurological:      Mental Status: She is alert.      Comments: See below.    Psychiatric:         Mood and Affect: Mood normal.          Musculoskeletal exam:  Gait intact.   Normal bulk and tone. Unremarkable spinal curvature.     Cervical spine:  Range of motion within normal limits   Tenderness in the cervical paraspinal muscles.Yes - right sided  Rotation/ext to right: pain free  Rotation/ext to left: pain free    Thoracic spine:    Kyphosis. No   Tenderness in the thoracic paraspinal muscles.Yes - right sided, periscapular    Neurologic exam:  CN:  Cranial nerves 2-12 are grossly intact  Motor:  5/5 UE strength            Reflexes:     Biceps:     R:  2/4 L: 2/4   Brachioradialis   R:  2/4 L: 2/4    Sensory:   Light touch: normal bilateral upper extremities    No allodynia, dysesthesia, or hyperalgesia.      BILLING TIME DOCUMENTATION:   The total TIME spent on this patient on the date of the encounter/appointment was 70 minutes.      TOTAL TIME includes:   Time spent preparing to see the patient (reviewing records and tests)   Time spent face to face (or over the phone) with the patient   Time spent ordering tests, medications, procedures and referrals   Time spent Referring and communicating with other healthcare professionals   Time spent documenting clinical information in Epic       Again, thank you for allowing me to participate in the care of your patient.      Sincerely,    BILLIE Watson CNP

## 2023-12-11 NOTE — NURSING NOTE
RN reviewed AVS with patient. Patient to contact clinic if any questions/concerns. Patient verbalized understanding.    Darlyn Santos RN

## 2023-12-11 NOTE — NURSING NOTE
Patient presents with:  Consult: Consult only      Data Unavailable         What medications are you using for pain? none    (New patients only) Have you been seen by another pain clinic/ provider? no    (Return Patients only) What refills are you needing today? no    Expectations: rosario Walls, EMT

## 2023-12-13 ENCOUNTER — OFFICE VISIT (OUTPATIENT)
Dept: OPHTHALMOLOGY | Facility: CLINIC | Age: 33
End: 2023-12-13
Attending: FAMILY MEDICINE
Payer: COMMERCIAL

## 2023-12-13 DIAGNOSIS — H33.322 ROUND HOLE OF LEFT RETINA WITHOUT DETACHMENT: ICD-10-CM

## 2023-12-13 DIAGNOSIS — H31.093 PERIPHERAL CHORIORETINAL SCARS OF BOTH EYES: ICD-10-CM

## 2023-12-13 DIAGNOSIS — H53.10 SUBJECTIVE VISUAL DISTURBANCE OF BOTH EYES: ICD-10-CM

## 2023-12-13 DIAGNOSIS — Z97.3 USES CONTACT LENSES: ICD-10-CM

## 2023-12-13 DIAGNOSIS — H52.13 MYOPIA OF BOTH EYES: ICD-10-CM

## 2023-12-13 DIAGNOSIS — R51.9 NONINTRACTABLE HEADACHE, UNSPECIFIED CHRONICITY PATTERN, UNSPECIFIED HEADACHE TYPE: Primary | ICD-10-CM

## 2023-12-13 PROCEDURE — 92004 COMPRE OPH EXAM NEW PT 1/>: CPT | Mod: GC | Performed by: STUDENT IN AN ORGANIZED HEALTH CARE EDUCATION/TRAINING PROGRAM

## 2023-12-13 PROCEDURE — 99207 FUNDUS AUTOFLUORESCENCE IMAGE (FAF) OU (BOTH EYES): CPT | Mod: 26 | Performed by: STUDENT IN AN ORGANIZED HEALTH CARE EDUCATION/TRAINING PROGRAM

## 2023-12-13 PROCEDURE — 99214 OFFICE O/P EST MOD 30 MIN: CPT | Performed by: STUDENT IN AN ORGANIZED HEALTH CARE EDUCATION/TRAINING PROGRAM

## 2023-12-13 PROCEDURE — 92250 FUNDUS PHOTOGRAPHY W/I&R: CPT | Performed by: STUDENT IN AN ORGANIZED HEALTH CARE EDUCATION/TRAINING PROGRAM

## 2023-12-13 PROCEDURE — 92134 CPTRZ OPH DX IMG PST SGM RTA: CPT | Performed by: STUDENT IN AN ORGANIZED HEALTH CARE EDUCATION/TRAINING PROGRAM

## 2023-12-13 ASSESSMENT — VISUAL ACUITY
OS_CC: 20/20
OD_CC: 20/20
CORRECTION_TYPE: CONTACTS
OD_CC+: -2
METHOD: SNELLEN - LINEAR

## 2023-12-13 ASSESSMENT — EXTERNAL EXAM - LEFT EYE: OS_EXAM: WNL

## 2023-12-13 ASSESSMENT — REFRACTION_WEARINGRX
OD_SPHERE: -8.25
OS_SPHERE: -7.75
SPECS_TYPE: SVL
OS_CYLINDER: SPHERE
OD_CYLINDER: SPHERE

## 2023-12-13 ASSESSMENT — CONF VISUAL FIELD
METHOD: COUNTING FINGERS
OS_INFERIOR_TEMPORAL_RESTRICTION: 0
OS_NORMAL: 1
OD_NORMAL: 1
OD_SUPERIOR_NASAL_RESTRICTION: 0
OS_SUPERIOR_NASAL_RESTRICTION: 0
OD_INFERIOR_NASAL_RESTRICTION: 0
OS_SUPERIOR_TEMPORAL_RESTRICTION: 0
OS_INFERIOR_NASAL_RESTRICTION: 0
OD_INFERIOR_TEMPORAL_RESTRICTION: 0
OD_SUPERIOR_TEMPORAL_RESTRICTION: 0

## 2023-12-13 ASSESSMENT — SLIT LAMP EXAM - LIDS
COMMENTS: WNL
COMMENTS: WNL

## 2023-12-13 ASSESSMENT — EXTERNAL EXAM - RIGHT EYE: OD_EXAM: WNL

## 2023-12-13 ASSESSMENT — CUP TO DISC RATIO
OD_RATIO: 0.5
OS_RATIO: 0.6

## 2023-12-13 ASSESSMENT — TONOMETRY
OD_IOP_MMHG: 14
OS_IOP_MMHG: 12
IOP_METHOD: TONOPEN

## 2023-12-13 NOTE — PROGRESS NOTES
HPI       Blurred Vision Evaluation     Additional comments: Vision distortion right eye>left eye - refer by PCP              Comments    Patient states having vision distortion right eye for about 3 years but now she also noticing them in left eye in the past year. Stable. Patient complains of having pressure sensation (ache) and when she feel like it get stronger she gets blurry near and distant vision. Patient also gets headache and migraines can cause the vision distortion.  Pt noticed the vision distortion can also be effected by weather or menstrual cycle. Pt does have floaters- stable.     No flashes of light.     Patient wears glasses and CL (Acuvue Oasys hydroluxe daily) - was just seen for glasses and CL prescription this summer     Pt currently does not use any eye drops.     Jordyn Schumacher COT 2:18 PM December 13, 2023             Last edited by Jordyn Farley on 12/13/2023  2:37 PM.        3 years of right eye pain and visual distortions that come and go. Has had thoracic outlet surgery for the right sided eye pain per her neurologist and has tried triptans which did not help with migraines.     Stress, heat and menses are some identifiable triggers.     Review of systems for the eyes was negative other than the pertinent positives/negatives listed in the HPI.    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    Assessment & Plan     Melia Braxton is a 33 year old female with the following diagnoses:    Headache  Subjective visual disturbance, both eyes  - Here for evaluation of headaches and visual disturbance; notes having episodes of visual disturbances/distortions in both eyes which last \"a while\" and subsequently may or may not develop a headache; vision at baseline at this time  - prior neuroimaging as above  - visual acuity excellent, IOP wnl, no RAPD OU, IOP wnl, no RAPD OU, CVF full to CF OU. 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    Phill Miranda MD  Resident Physician  [facility]    Attending Physician Attestation:  Complete documentation of historical and exam elements from today's encounter can be found in the full encounter summary report (not reduplicated in this progress note).  I personally obtained the chief complaint(s) and history of present illness.  I confirmed and edited as necessary the review of systems, past medical/surgical " history, family history, social history, and examination findings as documented by others; and I examined the patient myself.  I personally reviewed the relevant tests, images, and reports as documented above.  I formulated and edited as necessary the assessment and plan and discussed the findings and management plan with the patient and family. - Norma Meadows MD

## 2023-12-13 NOTE — NURSING NOTE
Chief Complaints and History of Present Illnesses   Patient presents with    Blurred Vision Evaluation     Vision distortion right eye>left eye      Chief Complaint(s) and History of Present Illness(es)       Blurred Vision Evaluation              Comments: Vision distortion right eye>left eye               Comments    Patient states having vision distortion right eye for about 3 years but now she also noticing them in left eye in the past year. Stable. Patient complains of having pressure sensation (ache) and when she feel like it get stronger she gets blurry near and distant vision. Patient also gets headache and migraines can cause the vision distortion.  Pt noticed the vision distortion can also be effected by weather or menstrual cycle. Pt does have floaters- stable.     No flashes of light.     Patient wears glasses and CL (Acuvue Oasys hydroluxe daily) - was just seen for glasses and CL prescription this summer     Pt currently does not use any eye drops.     Jordyn Schumacher COT 2:18 PM December 13, 2023

## 2024-01-02 ASSESSMENT — ENCOUNTER SYMPTOMS
WEAKNESS: 0
NERVOUS/ANXIOUS: 1
FEVER: 0
EYE PAIN: 1
COUGH: 0
HEARTBURN: 0
MYALGIAS: 1
JOINT SWELLING: 1
FREQUENCY: 0
SHORTNESS OF BREATH: 0
CHILLS: 0
PARESTHESIAS: 0
ARTHRALGIAS: 1
SORE THROAT: 0
PALPITATIONS: 1
DIARRHEA: 0
HEMATURIA: 0
BREAST MASS: 0
HEMATOCHEZIA: 0
NAUSEA: 0
DYSURIA: 0
CONSTIPATION: 0
HEADACHES: 0
DIZZINESS: 1
ABDOMINAL PAIN: 0

## 2024-01-07 ENCOUNTER — MYC MEDICAL ADVICE (OUTPATIENT)
Dept: FAMILY MEDICINE | Facility: CLINIC | Age: 34
End: 2024-01-07
Payer: COMMERCIAL

## 2024-01-08 ENCOUNTER — OFFICE VISIT (OUTPATIENT)
Dept: MIDWIFE SERVICES | Facility: CLINIC | Age: 34
End: 2024-01-08
Payer: COMMERCIAL

## 2024-01-08 ENCOUNTER — TELEPHONE (OUTPATIENT)
Dept: ANESTHESIOLOGY | Facility: CLINIC | Age: 34
End: 2024-01-08

## 2024-01-08 VITALS
OXYGEN SATURATION: 98 % | BODY MASS INDEX: 23.3 KG/M2 | HEIGHT: 66 IN | HEART RATE: 90 BPM | WEIGHT: 145 LBS | SYSTOLIC BLOOD PRESSURE: 112 MMHG | DIASTOLIC BLOOD PRESSURE: 61 MMHG

## 2024-01-08 DIAGNOSIS — Z13.21 ENCOUNTER FOR VITAMIN DEFICIENCY SCREENING: ICD-10-CM

## 2024-01-08 DIAGNOSIS — Z13.29 SCREENING FOR THYROID DISORDER: ICD-10-CM

## 2024-01-08 DIAGNOSIS — Z97.5 IUD (INTRAUTERINE DEVICE) IN PLACE: ICD-10-CM

## 2024-01-08 DIAGNOSIS — Z13.1 SCREENING FOR DIABETES MELLITUS: ICD-10-CM

## 2024-01-08 DIAGNOSIS — Z12.4 SCREENING FOR CERVICAL CANCER: ICD-10-CM

## 2024-01-08 DIAGNOSIS — Z01.419 ENCOUNTER FOR WELL WOMAN EXAM WITH ROUTINE GYNECOLOGICAL EXAM: Primary | ICD-10-CM

## 2024-01-08 DIAGNOSIS — Z13.220 SCREENING FOR CHOLESTEROL LEVEL: ICD-10-CM

## 2024-01-08 LAB
CHOLEST SERPL-MCNC: 183 MG/DL
FASTING STATUS PATIENT QL REPORTED: NO
HBA1C MFR BLD: 5.3 % (ref 0–5.6)
HDLC SERPL-MCNC: 54 MG/DL
TSH SERPL DL<=0.005 MIU/L-ACNC: 2.52 UIU/ML (ref 0.3–4.2)
VIT D+METAB SERPL-MCNC: 42 NG/ML (ref 20–50)

## 2024-01-08 PROCEDURE — 83036 HEMOGLOBIN GLYCOSYLATED A1C: CPT | Performed by: ADVANCED PRACTICE MIDWIFE

## 2024-01-08 PROCEDURE — 99385 PREV VISIT NEW AGE 18-39: CPT | Performed by: ADVANCED PRACTICE MIDWIFE

## 2024-01-08 PROCEDURE — 84443 ASSAY THYROID STIM HORMONE: CPT | Performed by: ADVANCED PRACTICE MIDWIFE

## 2024-01-08 PROCEDURE — 36415 COLL VENOUS BLD VENIPUNCTURE: CPT | Performed by: ADVANCED PRACTICE MIDWIFE

## 2024-01-08 PROCEDURE — 87624 HPV HI-RISK TYP POOLED RSLT: CPT | Performed by: ADVANCED PRACTICE MIDWIFE

## 2024-01-08 PROCEDURE — G0145 SCR C/V CYTO,THINLAYER,RESCR: HCPCS | Performed by: ADVANCED PRACTICE MIDWIFE

## 2024-01-08 PROCEDURE — 82465 ASSAY BLD/SERUM CHOLESTEROL: CPT | Performed by: ADVANCED PRACTICE MIDWIFE

## 2024-01-08 PROCEDURE — 83718 ASSAY OF LIPOPROTEIN: CPT | Performed by: ADVANCED PRACTICE MIDWIFE

## 2024-01-08 PROCEDURE — 82306 VITAMIN D 25 HYDROXY: CPT | Performed by: ADVANCED PRACTICE MIDWIFE

## 2024-01-08 NOTE — PROGRESS NOTES
"Melia is a 33 year old No obstetric history on file. female who presents for annual exam.     Menses are irregular and normal lasting 7 days.  Menses flow: normal.  No LMP recorded. (Menstrual status: IUD).. Using IUD for contraception.  She is not currently considering pregnancy.  Besides routine health maintenance, she has no other health concerns today .  Would like to establish care, would like to have her cholesterol and other biometrics drawn.  During ROS, pt shared a recent onset of pain with penetrative intercourse.  Pt is  and states her partner is supportive, and she sees a therapist.  Feels like she would like to talk to her therapist about it and then her  before pursuing other avenues of treatment.  States it is a burning pain while something is in the vagina, and that it started about 6 months ago. States she doesn't feel like it is an infection.  States she just finished grad school and they recently moved here from [place], and she noted that there were 'somethings that happened with her body' that made her feel not very good 'down there'.  Pt became teary while discussing.  Pt has a ParaGard placed in 2017   GYNECOLOGIC HISTORY:  Menarche:   Melia is sexually active with 1male partner(s) and is currently in monogamous relationship.    History sexually transmitted infections:Chlamydia  STI testing offered?  Declined  MIKE exposure: Unknown  History of abnormal Pap smear: NO - age 30- 65 PAP every 3 years recommended  Family history of breast CA: No  Family history of uterine/ovarian CA: No    Family history of colon CA: No    HEALTH MAINTENANCE:  Cholesterol: (No results found for: \"CHOL\" History of abnormal lipids: No  Mammo: na . History of abnormal Mammo: Not applicable.  Regular Self Breast Exams: Yes  Calcium/Vitamin D intake: source:  dairy, dietary supplement(s) Adequate? Yes  TSH: (  TSH   Date Value Ref Range Status   12/14/2015 3.04 0.40 - 4.00 mU/L Final    )  Pap; (No results " "found for: \"PAP\" )    HISTORY:  OB History    Para Term  AB Living   2 0 0 0 2 0   SAB IAB Ectopic Multiple Live Births   0 0 0 0 0      # Outcome Date GA Lbr Juaquin/2nd Weight Sex Delivery Anes PTL Lv   2 AB            1 AB              No past medical history on file.  Past Surgical History:   Procedure Laterality Date    BREAST SURGERY      paraclavicular thoracic outlet decompression Right 2020     Family History   Problem Relation Age of Onset    Cancer Father     Coronary Artery Disease No family hx of     Cerebrovascular Disease No family hx of     Glaucoma No family hx of     Macular Degeneration No family hx of      Social History     Socioeconomic History    Marital status:    Tobacco Use    Smoking status: Never     Passive exposure: Never    Smokeless tobacco: Never   Substance and Sexual Activity    Alcohol use: Yes     Alcohol/week: 0.0 standard drinks of alcohol     Comment: 5-6 drinks per week    Drug use: No    Sexual activity: Yes     Birth control/protection: I.U.D.     Social Determinants of Health     Financial Resource Strain: Low Risk  (2023)    Financial Resource Strain     Within the past 12 months, have you or your family members you live with been unable to get utilities (heat, electricity) when it was really needed?: No   Food Insecurity: Low Risk  (2023)    Food Insecurity     Within the past 12 months, did you worry that your food would run out before you got money to buy more?: No     Within the past 12 months, did the food you bought just not last and you didn t have money to get more?: No   Transportation Needs: Low Risk  (2023)    Transportation Needs     Within the past 12 months, has lack of transportation kept you from medical appointments, getting your medicines, non-medical meetings or appointments, work, or from getting things that you need?: No   Interpersonal Safety: Low Risk  (2023)    Interpersonal Safety     Do you feel physically " and emotionally safe where you currently live?: Yes     Within the past 12 months, have you been hit, slapped, kicked or otherwise physically hurt by someone?: No     Within the past 12 months, have you been humiliated or emotionally abused in other ways by your partner or ex-partner?: No   Housing Stability: Low Risk  (11/8/2023)    Housing Stability     Do you have housing? : Yes     Are you worried about losing your housing?: No       Current Outpatient Medications:     albuterol (PROAIR HFA/PROVENTIL HFA/VENTOLIN HFA) 108 (90 Base) MCG/ACT inhaler, Inhale 2 puffs into the lungs every 6 hours as needed for shortness of breath, wheezing or cough, Disp: 18 g, Rfl: 1    ALPRAZolam (XANAX) 0.5 MG tablet, , Disp: , Rfl:     cyclobenzaprine (FLEXERIL) 5 MG tablet, Take 1 tablet (5 mg) by mouth 3 times daily as needed for muscle spasms, Disp: 30 tablet, Rfl: 0    methocarbamol (ROBAXIN) 500 MG tablet, Take 1-2 tablets (500-1,000 mg) by mouth 4 times daily as needed for muscle spasms, Disp: 60 tablet, Rfl: 1    methylphenidate (RITALIN) 10 MG tablet, Take 1 tablet by mouth daily, Disp: , Rfl:     paragard intrauterine copper, 1 each by Intrauterine route once, Disp: , Rfl:     rizatriptan (MAXALT) 10 MG tablet, Take 1 tablet (10 mg) by mouth at onset of headache for migraine May repeat in 2 hours. Max 3 tablets/24 hours., Disp: 12 tablet, Rfl: 0   No Known Allergies    Past medical, surgical, social and family history were reviewed and updated in EPIC.    ROS:   C:     NEGATIVE for fever, chills, change in weight  I:       NEGATIVE for worrisome rashes, moles or lesions  E:     NEGATIVE for vision changes or irritation  E/M: NEGATIVE for ear, mouth and throat problems  R:     NEGATIVE for significant cough or SOB  CV:   NEGATIVE for chest pain, palpitations or peripheral edema  GI:     NEGATIVE for nausea, abdominal pain, heartburn, or change in bowel habits  :   NEGATIVE for frequency, dysuria, hematuria, vaginal  "discharge, or irregular bleeding  M:     NEGATIVE for significant arthralgias or myalgia  N:      NEGATIVE for weakness, dizziness or paresthesias  E:      NEGATIVE for temperature intolerance, skin/hair changes  P:      NEGATIVE for changes in mood or affect.    EXAM:  /61   Pulse 90   Ht 1.676 m (5' 6\")   Wt 65.8 kg (145 lb)   SpO2 98%   BMI 23.40 kg/m     BMI: Body mass index is 23.4 kg/m .  Constitutional: healthy, alert and no distress  Head: Normocephalic. No masses, lesions, tenderness or abnormalities  Neck: Neck supple. Trachea midline. No adenopathy. Thyroid symmetric, normal size.   Cardiovascular: RRR.   Respiratory: Negative.   Breast: Implants noted: Breasts reveal mild symmetric fibrocystic densities, but there are no dominant, discrete, fixed or suspicious masses found.  Gastrointestinal: Abdomen soft, non-tender, non-distended. No masses, organomegaly.  :  Brief spec exam secondary to pt discomfort with exam   Vulva:  No external lesions, normal female hair distribution, no inguinal adenopathy.    Urethra:  Midline, non-tender, well supported, no discharge  Cervix: WNL, paragard string approx 1 in outside of cervical os   Vagina:  Moist, pink, no abnormal discharge, no lesions  Uterus:  Normal size, anteverted , non-tender, freely mobile  Ovaries:  No masses appreciated, non-tender, mobile  Rectal Exam: deferred  Musculoskeletal: extremities normal  Skin: no suspicious lesions or rashes  Psychiatric: Affect appropriate, cooperative,mentation appears normal.     COUNSELING:   Reviewed preventive health counseling, as reflected in patient instructions       Regular exercise       Healthy diet/nutrition       Sexual health and well being    reports that she has never smoked. She has never been exposed to tobacco smoke. She has never used smokeless tobacco.    Body mass index is 23.4 kg/m .    FRAX Risk Assessment    ASSESSMENT:  33 year old female with satisfactory annual exam  (Z01.419) " Encounter for well woman exam with routine gynecological exam  (primary encounter diagnosis)  Comment:   Plan:     (Z12.4) Screening for cervical cancer  Comment:   Plan: Pap screen with HPV - recommended age 30 - 65         years            (Z13.29) Screening for thyroid disorder  Comment:   Plan: TSH with free T4 reflex            (Z13.220) Screening for cholesterol level  Comment:   Plan: Cholesterol, HDL cholesterol            (Z13.1) Screening for diabetes mellitus  Comment:   Plan: HEMOGLOBIN A1C - Z13.1            (Z13.21) Encounter for vitamin deficiency screening  Comment:   Plan: Vitamin D Deficiency         Discussed options of pelvic floor PT, or referral to sexual health clinic at the [facility]  Given resources.  RTC 1 yr for annual, sooner with concerns  BILLIE MontemayorM

## 2024-01-08 NOTE — TELEPHONE ENCOUNTER
Hi,  Please call us back to schedule a follow up with Chantelle at the pain management clinic. [phone]

## 2024-01-08 NOTE — PATIENT INSTRUCTIONS
Dear Melia     It was a delight to meet you today.  Here is a list of things that may help.    Look up Mary Aviles, her book is You Are Not Broken.    Non Hormonal Options for Vaginal Atrophy  Lubricants  Water Based   Astroglide Liquid  Astroglide Gel  Astroglide  Just Like Me  K-Y Jelly  Pre-seed  Slippery Stuff  Liquid Silk  Good Clean Love   YES Personal Lubricant   Silicone Based  Astroglide X  ID Millenium  K-Y Intrigue  Pink  Pjur [place]  Oil Based  Elegance Women's Lubricants  Oils (olive, coconut, mineral, baby) Moisturizers   Replens, RepHresh  Vagisil Feminiease  K-Y SILK-E  Luvena  Silken Secret      Topical Lidocaine     Pelvic Floor PT               From the [facility], A Clinician's Guide, 6th Edition    [facility]   [email or web address]     [place] location:  [facility]  [address]  [place], [place] [id number] [phone]     [place] location:  [facility]  [address], [address]  [place], [place] [id number] [phone]  __________________________________________________    [facility]  [address] [place] [address], [address]  [place], [place]  [address] [phone]  __________________________________________________    [facility]  [email or web address]    [place] Location:  [address], [address]  [place] [address]     [place] Location:  [address], [address]  [place] [address] [phone]      A Healthy Lifestyle: Care Instructions  Your Care Instructions    A healthy lifestyle can help you feel good, stay at a healthy weight, and have plenty of energy for both work and play and is something you can share with your whole family.  A healthy lifestyle also can lower your risk for serious health problems, such as high blood pressure, heart disease, and diabetes.    You can follow a few steps listed  below to improve your health and the health of your family.    Follow-up care is a key part of your treatment and safety. Be sure to make and go to all appointments, and call your doctor if you are having problems. It's also a good idea to know your test results and keep a list of the medicines you take.    How can you care for yourself at home?            Do not eat too much sugar, fat, or fast foods. You can still have dessert and treats now and then. The goal is moderation.            Start small to improve your eating habits. Pay attention to portion sizes, drink less juice and soda pop, and eat more fruits and vegetables.        Eat a healthy amount of food. A 3-ounce serving of meat, for example, is about the size of a deck of cards. Fill the rest of your plate with vegetables and whole grains.        Limit the amount of soda and sports drinks you have every day. Drink more water when you are thirsty.        Eat at least 5 servings of fruits and vegetables every day. It may seem like a lot, but it is not hard to reach this goal. A serving or helping is 1 piece of fruit, 1 cup of vegetables, or 2 cups of leafy, raw vegetables. Have an apple or some carrot sticks as an afternoon snack instead of a candy bar. Try to have fruits and/or vegetables at every meal.            Make exercise part of your daily routine. You may want to start with simple activities, such as walking, bicycling, or slow swimming. Try to be active 30 to 60 minutes every day. You do not need to do all 30 to 60 minutes all at once. For example, you can exercise 3 times a day for 10 or 20 minutes. Moderate exercise is safe for most people, but it is always a good idea to talk to your doctor before starting an exercise program.            Keep moving. Mow the lawn, work in the garden, or clean your house. Take the stairs instead of the elevator at work.    Quit smoking            If you smoke, quit. People who smoke have an increased risk for  heart attack, stroke, cancer, and other lung illnesses. Quitting is hard, but there are ways to boost your chance of quitting tobacco for good.            Use nicotine gum, patches, or lozenges.            Ask your doctor about stop-smoking programs and medicines.            Keep trying.  In addition to reducing your risk of diseases in the future, you will notice some benefits soon after you stop using tobacco. If you have shortness of breath or asthma symptoms, they will likely get better within a few weeks after you quit.            Limit how much alcohol you drink. Moderate amounts of alcohol (up to 2 drinks a day for men, 1 drink a day for women) are okay. But drinking too much can lead to liver problems, high blood pressure, and other health problems.    Family health  If you have a family, there are many things you can do together to improve your health.            Eat meals together as a family as often as possible.            Eat healthy foods. This includes fruits, vegetables, lean meats and dairy, and whole grains.            Include your family in your fitness plan. Most people think of activities such as jogging or tennis as the way to fitness, but there are many ways you and your family can be more active. Anything that makes you breathe hard and gets your heart pumping is exercise. Here are some tips:            Walk to do errands or to take your child to school or the bus.            Go for a family bike ride after dinner instead of watching TV.

## 2024-01-09 ASSESSMENT — PAIN SCALES - PAIN ENJOYMENT GENERAL ACTIVITY SCALE (PEG)
INTERFERED_GENERAL_ACTIVITY: 9
INTERFERED_ENJOYMENT_LIFE: 10 - COMPLETELY INTERFERES
INTERFERED_ENJOYMENT_LIFE: 10
PEG_TOTALSCORE: 8.33
AVG_PAIN_PASTWEEK: 6

## 2024-01-10 ENCOUNTER — OFFICE VISIT (OUTPATIENT)
Dept: ANESTHESIOLOGY | Facility: CLINIC | Age: 34
End: 2024-01-10
Payer: COMMERCIAL

## 2024-01-10 VITALS — HEART RATE: 87 BPM | SYSTOLIC BLOOD PRESSURE: 116 MMHG | OXYGEN SATURATION: 98 % | DIASTOLIC BLOOD PRESSURE: 79 MMHG

## 2024-01-10 DIAGNOSIS — M79.18 MYOFASCIAL PAIN: Primary | ICD-10-CM

## 2024-01-10 LAB
BKR LAB AP GYN ADEQUACY: NORMAL
BKR LAB AP GYN INTERPRETATION: NORMAL
BKR LAB AP HPV REFLEX: NORMAL
BKR LAB AP PREVIOUS ABNORMAL: NORMAL
PATH REPORT.COMMENTS IMP SPEC: NORMAL
PATH REPORT.COMMENTS IMP SPEC: NORMAL
PATH REPORT.RELEVANT HX SPEC: NORMAL

## 2024-01-10 PROCEDURE — 20553 NJX 1/MLT TRIGGER POINTS 3/>: CPT | Performed by: ANESTHESIOLOGY

## 2024-01-10 PROCEDURE — 99207 PR NO CHARGE INJECTABLE MED/DRUG: CPT | Performed by: ANESTHESIOLOGY

## 2024-01-10 RX ORDER — BUPIVACAINE HYDROCHLORIDE 2.5 MG/ML
10 INJECTION, SOLUTION INFILTRATION; PERINEURAL ONCE
Status: COMPLETED | OUTPATIENT
Start: 2024-01-10 | End: 2024-01-10

## 2024-01-10 RX ORDER — TRIAMCINOLONE ACETONIDE 40 MG/ML
40 INJECTION, SUSPENSION INTRA-ARTICULAR; INTRAMUSCULAR ONCE
Status: ACTIVE | OUTPATIENT
Start: 2024-01-10

## 2024-01-10 RX ORDER — LIDOCAINE HYDROCHLORIDE 20 MG/ML
10 INJECTION, SOLUTION INFILTRATION; PERINEURAL ONCE
Status: COMPLETED | OUTPATIENT
Start: 2024-01-10 | End: 2024-01-10

## 2024-01-10 RX ADMIN — LIDOCAINE HYDROCHLORIDE 10 ML: 20 INJECTION, SOLUTION INFILTRATION; PERINEURAL at 13:13

## 2024-01-10 RX ADMIN — BUPIVACAINE HYDROCHLORIDE 25 MG: 2.5 INJECTION, SOLUTION INFILTRATION; PERINEURAL at 13:10

## 2024-01-10 ASSESSMENT — PAIN SCALES - GENERAL: PAINLEVEL: SEVERE PAIN (7)

## 2024-01-10 NOTE — NURSING NOTE
Patient presents with:  Follow Up: Follow-up Shoulder, Neck, Back Pain Trigger Point Injections      Severe Pain (7)         What medications are you using for pain? Nothing    (New patients only) Have you been seen by another pain clinic/ provider? yes    (Return Patients only) What refills are you needing today? no

## 2024-01-10 NOTE — PROGRESS NOTES
Pre procedure Diagnosis: myofascial pain   Post procedure Diagnosis: Same  Procedure performed: trigger point injections  Anesthesia: none  Complications: none  Operators: Shara Montalvo MD, Atul Alfaro MD (anesthesiology resident0    Indications:   Melia Braxton is a 33 year old female with a history of myofascial pain.  Exam shows myofascial pain of the muscle groups listed below and they have tried conservative treatment including physical therapy and medications.    Options/alternatives, benefits and risks were discussed with the patient including bleeding, infection, tissue trauma and pnuemothorax.  Questions were answered to her satisfaction and she agrees to proceed. Voluntary informed consent was obtained and signed.     Vitals were reviewed: Yes  Allergies were reviewed:  Yes   Medications were reviewed:  Yes   Pre-procedure pain score: Severe Pain (7)    Procedure:  After getting informed consent, a Pause for the Cause was performed.    Trigger points were identified by patient, and marked when appropriate.  The area was prepped with Chloroprep.    Using clean technique, injections were completed using a 25G, 1.5 inch needle.  After negative aspiration, injection was completed.  A total of 9 locations were injected.  When possible, tissue was retracted from the chest wall to avoid lung injury.    Muscle groups injected:  Right parascapular  Right rhomboid  Right trapezius  Right cervical paraspinal    Injection solution contained:  10 ml of 2 % lidocaine and 10 ml of 0.25% bupivacaine.    Hemostasis was achieved, the area was cleaned, and bandaids were placed when appropriate.  The patient tolerated the procedure well.      Post-procedure pain score: 4/10  Follow-up includes:   -repeat prn    Shara Montalvo MD    Pain Medicine  Department of Anesthesiology  [facility]

## 2024-01-10 NOTE — PROGRESS NOTES
Melia Braxton  [id number]  female  33 year old    NO STEROID     Reason for procedure/surgery: myofascial pain     Patient Active Problem List   Diagnosis    Asthma    Anxiety    Depression    Acne vulgaris    ASCUS with positive high risk HPV cervical    Enlarged lymph nodes    IUD (intrauterine device) in place    Freiberg's disease, left    Iron deficiency       Past Surgical History:    Past Surgical History:   Procedure Laterality Date    BREAST SURGERY      paraclavicular thoracic outlet decompression Right 11/2020       Past Medical History: No past medical history on file.    Social History:   Social History     Tobacco Use    Smoking status: Never     Passive exposure: Never    Smokeless tobacco: Never   Substance Use Topics    Alcohol use: Yes     Alcohol/week: 0.0 standard drinks of alcohol     Comment: 5-6 drinks per week       Family History:   Family History   Problem Relation Age of Onset    Cancer Father     Coronary Artery Disease No family hx of     Cerebrovascular Disease No family hx of     Glaucoma No family hx of     Macular Degeneration No family hx of        Allergies: No Known Allergies    Active Medications:   Current Outpatient Medications   Medication Sig Dispense Refill    albuterol (PROAIR HFA/PROVENTIL HFA/VENTOLIN HFA) 108 (90 Base) MCG/ACT inhaler Inhale 2 puffs into the lungs every 6 hours as needed for shortness of breath, wheezing or cough 18 g 1    ALPRAZolam (XANAX) 0.5 MG tablet       cyclobenzaprine (FLEXERIL) 5 MG tablet Take 1 tablet (5 mg) by mouth 3 times daily as needed for muscle spasms 30 tablet 0    methocarbamol (ROBAXIN) 500 MG tablet Take 1-2 tablets (500-1,000 mg) by mouth 4 times daily as needed for muscle spasms 60 tablet 1    methylphenidate (RITALIN) 10 MG tablet Take 1 tablet by mouth daily      paragard intrauterine copper 1 each by Intrauterine route once      rizatriptan (MAXALT) 10 MG tablet Take 1 tablet (10 mg) by mouth at onset of headache for  migraine May repeat in 2 hours. Max 3 tablets/24 hours. 12 tablet 0       Systemic Review:   CONSTITUTIONAL: NEGATIVE for fever, chills, change in weight  ENT/MOUTH: NEGATIVE for ear, mouth and throat problems  RESP: NEGATIVE for significant cough or SOB  CV: NEGATIVE for chest pain, palpitations or peripheral edema    Physical Examination:   Vital Signs: There were no vitals taken for this visit.  {Exam Brief Partially Selected:500512}    Plan: Appropriate to proceed as scheduled.      Jayro Perea MD  1/10/2024

## 2024-01-10 NOTE — LETTER
1/10/2024       RE: Melia Braxton  [address] [place] [address]       Dear Colleague,    Thank you for referring your patient, Melia Braxton, to the [facility] [place] at [facility]. Please see a copy of my visit note below.    Pre procedure Diagnosis: myofascial pain   Post procedure Diagnosis: Same  Procedure performed: trigger point injections  Anesthesia: none  Complications: none  Operators: Shara Montalvo MD, Atul Alfaro MD (anesthesiology resident0    Indications:   Melia Braxton is a 33 year old female with a history of myofascial pain.  Exam shows myofascial pain of the muscle groups listed below and they have tried conservative treatment including physical therapy and medications.    Options/alternatives, benefits and risks were discussed with the patient including bleeding, infection, tissue trauma and pnuemothorax.  Questions were answered to her satisfaction and she agrees to proceed. Voluntary informed consent was obtained and signed.     Vitals were reviewed: Yes  Allergies were reviewed:  Yes   Medications were reviewed:  Yes   Pre-procedure pain score: Severe Pain (7)    Procedure:  After getting informed consent, a Pause for the Cause was performed.    Trigger points were identified by patient, and marked when appropriate.  The area was prepped with Chloroprep.    Using clean technique, injections were completed using a 25G, 1.5 inch needle.  After negative aspiration, injection was completed.  A total of 9 locations were injected.  When possible, tissue was retracted from the chest wall to avoid lung injury.    Muscle groups injected:  Right parascapular  Right rhomboid  Right trapezius  Right cervical paraspinal    Injection solution contained:  10 ml of 2 % lidocaine and 10 ml of 0.25% bupivacaine.    Hemostasis was achieved, the area was cleaned, and bandaids were placed when  appropriate.  The patient tolerated the procedure well.      Post-procedure pain score: 4/10  Follow-up includes:   -repeat prn        Again, thank you for allowing me to participate in the care of your patient.      Sincerely,    Shara Montalvo MD

## 2024-01-10 NOTE — PATIENT INSTRUCTIONS
Treatment planning:    Trigger Point Injections completed today-   Call us with any concerns for infection: redness and drainage at the injection site, fever, chills, sweats       Recommended Follow up:      Follow up with Chantelle Farrar.       To speak with a nurse, schedule/reschedule/cancel a clinic appointment, or request a medication refill call: [phone]    You can also reach us by [employer]: [email or web address]

## 2024-01-11 LAB
HUMAN PAPILLOMA VIRUS 16 DNA: NEGATIVE
HUMAN PAPILLOMA VIRUS 18 DNA: NEGATIVE
HUMAN PAPILLOMA VIRUS FINAL DIAGNOSIS: NORMAL
HUMAN PAPILLOMA VIRUS OTHER HR: NEGATIVE

## 2024-01-12 PROBLEM — R87.810 ASCUS WITH POSITIVE HIGH RISK HPV CERVICAL: Status: ACTIVE | Noted: 2022-01-18

## 2024-01-12 PROBLEM — R87.610 ASCUS WITH POSITIVE HIGH RISK HPV CERVICAL: Status: ACTIVE | Noted: 2022-01-18

## 2024-01-28 ASSESSMENT — ACTIVITIES OF DAILY LIVING (ADL)
WALKING_BETWEEN_ROOMS: A LITTLE BIT OF DIFFICULTY
ANY_OF_YOUR_USUAL_WORK,_HOUSEWORK_OR_SCHOOL_ACTIVITIES: A LITTLE BIT OF DIFFICULTY
SQUATTING: MODERATE DIFFICULTY
SITTING_FOR_1_HOUR: A LITTLE BIT OF DIFFICULTY
GETTING_INTO_AND_OUT_OF_A_BATH: A LITTLE BIT OF DIFFICULTY
YOUR_USUAL_HOBBIES,_RECREATIONAL_OR_SPORTING_ACTIVITIES: QUITE A BIT OF DIFFICULTY
PERFORMING_HEAVY_ACTIVITIES_AROUND_YOUR_HOME: MODERATE DIFFICULTY
RUNNING_ON_EVEN_GROUND: EXTREME DIFFICULTY OR UNABLE TO PERFORM ACTIVITY
PUTTING_ON_YOUR_SHOES_OR_SOCKS: A LITTLE BIT OF DIFFICULTY
RUNNING_ON_UNEVEN_GROUND: EXTREME DIFFICULTY OR UNABLE TO PERFORM ACTIVITY
STANDING_FOR_1_HOUR: MODERATE DIFFICULTY
GOING_UP_OR_DOWN_10_STAIRS: A LITTLE BIT OF DIFFICULTY
WALKING_2_BLOCKS: A LITTLE BIT OF DIFFICULTY
LEFS_RAW_SCORE: 0
MAKING_SHARP_TURNS_WHILE_RUNNING_FAST: EXTREME DIFFICULTY OR UNABLE TO PERFORM ACTIVITY
WALKING_A_MILE: A LITTLE BIT OF DIFFICULTY
SHOPPING: EXTREME DIFFICULTY OR UNABLE TO PERFORM ACTIVITY
LIFTING_AN_OBJECT,_LIKE_A_BAG_OF_GROCERIES_FROM_THE_FLOOR: A LITTLE BIT OF DIFFICULTY
PLEASE_INDICATE_YOR_PRIMARY_REASON_FOR_REFERRAL_TO_THERAPY:: FOOT AND/OR ANKLE
PERFORMING_LIGHT_ACTIVITIES_AROUND_YOUR_HOME: A LITTLE BIT OF DIFFICULTY
ROLLING_OVER_IN_BED: A LITTLE BIT OF DIFFICULTY
LEFS_SCORE(%): 0
GETTING_INTO_OR_OUT_OF_A_CAR: A LITTLE BIT OF DIFFICULTY

## 2024-01-29 ENCOUNTER — THERAPY VISIT (OUTPATIENT)
Dept: PHYSICAL THERAPY | Facility: CLINIC | Age: 34
End: 2024-01-29
Payer: COMMERCIAL

## 2024-01-29 ENCOUNTER — OFFICE VISIT (OUTPATIENT)
Dept: ANESTHESIOLOGY | Facility: CLINIC | Age: 34
End: 2024-01-29
Payer: COMMERCIAL

## 2024-01-29 VITALS — HEART RATE: 76 BPM | OXYGEN SATURATION: 99 % | SYSTOLIC BLOOD PRESSURE: 139 MMHG | DIASTOLIC BLOOD PRESSURE: 79 MMHG

## 2024-01-29 DIAGNOSIS — R07.89 ANTERIOR CHEST WALL PAIN: ICD-10-CM

## 2024-01-29 DIAGNOSIS — M25.511 CHRONIC RIGHT SHOULDER PAIN: ICD-10-CM

## 2024-01-29 DIAGNOSIS — Z86.69 HISTORY OF THORACIC OUTLET SYNDROME: ICD-10-CM

## 2024-01-29 DIAGNOSIS — M79.672 LEFT FOOT PAIN: Primary | ICD-10-CM

## 2024-01-29 DIAGNOSIS — M79.18 MYOFASCIAL PAIN: ICD-10-CM

## 2024-01-29 DIAGNOSIS — M54.2 NECK PAIN ON RIGHT SIDE: Primary | ICD-10-CM

## 2024-01-29 DIAGNOSIS — G89.29 CHRONIC RIGHT SHOULDER PAIN: ICD-10-CM

## 2024-01-29 PROCEDURE — 97110 THERAPEUTIC EXERCISES: CPT | Mod: GP | Performed by: PHYSICAL THERAPIST

## 2024-01-29 PROCEDURE — 99214 OFFICE O/P EST MOD 30 MIN: CPT

## 2024-01-29 PROCEDURE — 97530 THERAPEUTIC ACTIVITIES: CPT | Mod: GP | Performed by: PHYSICAL THERAPIST

## 2024-01-29 PROCEDURE — 97161 PT EVAL LOW COMPLEX 20 MIN: CPT | Mod: GP | Performed by: PHYSICAL THERAPIST

## 2024-01-29 ASSESSMENT — PAIN SCALES - PAIN ENJOYMENT GENERAL ACTIVITY SCALE (PEG)
PEG_TOTALSCORE: 3.67
AVG_PAIN_PASTWEEK: 4
INTERFERED_GENERAL_ACTIVITY: 3
INTERFERED_ENJOYMENT_LIFE: 4

## 2024-01-29 ASSESSMENT — PAIN SCALES - GENERAL: PAINLEVEL: MODERATE PAIN (4)

## 2024-01-29 NOTE — NURSING NOTE
Patient did not want an AVS. Writer printed TENS Unit from previous visit.    Merly Cantrell RNCC

## 2024-01-29 NOTE — PROGRESS NOTES
[facility]     Date of visit: 1/29/2024      Assessment:   Melia Braxton is a 33 year old female with a past medical history significant for Freiberg's disease (left), hx anxiety/depression, thoracic outlet syndrome, s/p right TOS decompression (2020), who presents with complaints of right sided neck/shoulder/upper back and right anterior upper chest.      Right sided neck/upper back/anterior chest - Onset of current pain symptoms following thoracic outlet decompression surgery in 2020. Describes pain as constant aching in shoulder and periscapular area, burning quality in medial trapezius and into right side of neck, cramping and sharp along medial scapular border, with fluctuating pain intensity. Intermittent burning radiates into right side of head/temple, intermittent radicular pain into upper arm when pain is severe. Pain worsens with activities that engage use of arms, notes symptoms increase when arms are raised shoulder level or above. She uses a variety of devices at home that target myofascial pain/trigger points and cervical traction, as well as stretching and heat. She reports some degree of benefit with these tools, but benefit not sustained. No prior injections, limited medication trials, never used TENS unit. On exam, BUE strength and sensory intact, cervical ROM is WNL, right sided cervical, trapezius, periscapular myofascial TTP. Etiology is not entirely clear, though seems most consistent with myofascial process, hx of TOS and s/p decompression surgery.      Assigned to [facility] nursing team.     Visit Diagnoses:  1. Neck pain on right side    2. Myofascial pain    3. History of thoracic outlet syndrome    4. Chronic right shoulder pain    5. Anterior chest wall pain        Plan:  Diagnosis reviewed, treatment option addressed, and risk/benifits discussed.  Self-care instructions given.  I am recommending a multidisciplinary treatment plan to help this patient better manage  their pain.      Pain Physical Therapy:  NO   -  Current engaged with Kevin PISANO, interested in exploring dry needling. Advised I am happy to provide internal referral for this, if her last visit is within the past 6 months.       Pain Psychologist to address relaxation, behavioral change, coping style, and other factors important to improvement.  NO     Diagnostic Studies:  No recent cervical imaging. May consider in future if indicated.      Medication Management:   Methocarbamol 500-1000 mg up to four times daily as needed. Advised to start with 1 tab (500 mg) and increase to 2 tabs (1000 mg) as tolerated. Monitor for improvement in pain level/intensity. Has not picked up prescription sent in at last visit. Advised to consider trial of medication as tool for days with increased neck/upper back pain. Okay to refill if seen by me within last 6 months.   Monitor for sedation - may cause dizziness or drowsiness. Be careful driving/moving around until you know effects of medication. Avoid concurrent alcohol use.      Potential procedures:   Right sided cervical and periscapular TPI - completed on 1/10/24 with Dr. Montalvo, reports substantial benefit. Discussed repeat in future if needed. Also interested in exploring dry needling.      Other Orders/Referrals:   TENS unit - DME order provided at prior visit. Recommend starting with lowest intensity and increase as tolerated. Copy of DME order provided today in clinic.      Follow up with BILLIE Watson CNP in 3-6 months, or sooner if needed     Review of Electronic Chart: Today I have also reviewed available medical information in the patient's medical record at [facility] and Care Everywhere (if available), including relevant provider notes, laboratory work, and imaging.     Chantelle Farrar DNP, APRN, AGNP-C  [facility]     -------------------------------------------------    Subjective:    Chief complaint:   Chief Complaint    Patient presents with    Follow Up     Return pain       Interval history:  Melia Braxton is a 33 year old female last seen on 12/11/23.  They are a patient of mine seen in follow up.     Recommendations/plan at the last visit included:  Pain Physical Therapy:  NO   -  Completed PT in the past, currently engaged in one on one pilates      Pain Psychologist to address relaxation, behavioral change, coping style, and other factors important to improvement.  NO     Diagnostic Studies:  No recent cervical imaging. May consider in future if indicated.      Medication Management:   Flexeril recently ordered by PCP, though she was not able to  from pharmacy. Recommend alternative - start methocarbamol 500-1000 mg up to four times daily as needed. Advised to start with 1 tab (500 mg) and increase to 2 tabs (1000 mg) as tolerated. Monitor for improvement in pain level/intensity.   Monitor for sedation - may cause dizziness or drowsiness. Be careful driving/moving around until you know effects of medication. Avoid concurrent alcohol use.      Potential procedures:   Recommend right sided cervical and periscapular TPI. Advised she will schedule with physician partner in clinic for injections.      Other Orders/Referrals:   TENS unit - DME order provided today. Recommend starting with lowest intensity and increase as tolerated.      Follow up with BILLIE Watson CNP in 8 week or sooner if needed    Since her last visit, Melia Braxton reports:  -TPI with Dr. Montalvo 1/10/24, she reports significant benefit in periscapular area/trapezius.   -She has noticed more prominent pain more anteriorly on neck, but she is potentially interested trial dry needling.   -She is engaging in PT right now through [facility]. PT advised her on dry needling option.  -She stated her PT does not do it herself, but she would refer her to someone, though she open to recommendations.   -Methocarbamol recommended at last visit, she  "states she never picked up the prescription.   -She has been hesitant to trial muscle relaxants due to adverse experience in 2021 when she had a significant muscle spasms in low back and could not walk.   -TENS unit ordered at last visit. She did not  this unit but would like to get another copy of the order.     Pain Information:   Pain rating: averages 4/10 on a 0-10 scale.        HPI from initial visit with me on 12/11/23:  Melia Braxton is a 33 year old female presents with a chief complaint of right sided neck and shoulder, anterior upper chest wall pain, intermittent radiating pain into upper arm (does not extend into lower arm/pain).      The pain has been present for since TOS decompression surgery in 2020 .    The pain is Moderate Pain (5) in severity.    The pain is described as constant aching in shoulder and periscapular area, burning quality in medial trap and into right side of neck, cramping and sharp along medial scapular border, fluctuating intensity. Intermittent burning radiates into right side of head/temple, intermittent radicular pain into upper arm when pain is severe.   The pain is alleviated by stretching, theragun massage device, \"The Knuckle\" device that lay (sounds like cervical traction, states she saw online), heat, spine fitter acupressure trigger point tool, cervical spine roller (very helpful), foam roller.    It is exacerbated by vacuuming house, blowing dry hair. Household chores, activities that really engage arms. Pain is worse in the morning after sleeping.   Modalities that have been utilized in the past which were helpful include PT (Marco Antonio Taveras - helpful to some extent but benefit not sustained), awkward positioning with neck (while sitting on couch, lying in bed, working at computer).    Things that were not helpful, but tried ,include PT (no sustained benefit).    The patient has never tried Botox, medications, TENS unit.  Melia Braxton has not been seen at " a pain clinic in the past.       -She had onset of migraine in 2020 that lasted 3 months, saw neurology and was dx with thoracic outlet (vascular). She then had surgery and onset of right anterior upper chest wall, shoulder and neck pain.   -She had PT and surgery here in , then moved to [place] for 2 years while  was in grad school in 2021, then moved back here a few months ago.   -She is doing one on one pilates  -She lives with , no children yet.   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         Current Pain Treatments:    Medications:         Rizatriptan PRN              Methocarbamol 500-1000 mg QID PRN     2. Other therapies:                             TENS   Dry needling - considering trial through [facility] PT      Review of [place] Prescription Monitoring Program (): No concern for abuse or misuse of controlled medications based on this report.     Past pain treatments:  TPI 1/10/24 - significant benefit.     Medications:  Current Outpatient Medications   Medication Sig Dispense Refill    albuterol (PROAIR HFA/PROVENTIL HFA/VENTOLIN HFA) 108 (90 Base) MCG/ACT inhaler Inhale 2 puffs into the lungs every 6 hours as needed for shortness of breath, wheezing or cough 18 g 1    ALPRAZolam (XANAX) 0.5 MG tablet       cyclobenzaprine (FLEXERIL) 5 MG tablet Take 1 tablet (5 mg) by mouth 3 times daily as needed for muscle spasms 30 tablet 0    methocarbamol (ROBAXIN) 500 MG tablet Take 1-2 tablets (500-1,000 mg) by mouth 4 times daily as needed for muscle spasms 60 tablet 1    methylphenidate (RITALIN) 10 MG tablet Take 1 tablet by mouth daily      paragard intrauterine copper 1 each by Intrauterine route once      rizatriptan (MAXALT) 10 MG tablet Take 1 tablet (10 mg) by mouth at onset of headache for migraine May repeat in 2 hours. Max 3 tablets/24 hours. 12 tablet 0       Medical History: any changes in medical history since they were last seen?  No      Objective:    Physical Exam:  Blood pressure 139/79, pulse 76, SpO2 99%.  Constitutional: Well developed, well nourished, appears stated age.  Gait: Intact   HEENT: Head atraumatic, normocephalic. Eyes without conjunctival injection or jaundice. Oropharynx clear. Neck supple. No obvious neck masses.  Skin: No rash, lesions, or petechiae of exposed skin.   Psychiatric/mental status: Alert, without lethargy or stupor. Speech fluent. Appropriate affect. Mood normal. Able to follow commands without difficulty.     Diagnostic Tests/Imaging/Labs:  [facility] 11/13/23 - WN    BILLING TIME DOCUMENTATION:   The total TIME spent on this patient on the date of the encounter/appointment was 25 minutes.      TOTAL TIME includes:   Time spent preparing to see the patient (reviewing records and tests)   Time spent face to face (or over the phone) with the patient   Time spent ordering tests, medications, procedures and referrals   Time spent Referring and communicating with other healthcare professionals   Time spent documenting clinical information in Epic

## 2024-01-29 NOTE — PROGRESS NOTES
"PHYSICAL THERAPY EVALUATION  Type of Visit: Evaluation    See electronic medical record for Abuse and Falls Screening details.    Subjective   KEY PT FINDINGS:  1) TTP over met heads on the left foot (both dorsal and plantar)   2) No significant loss of motion, empty end feel with PROM  3) Did not do any weight bearing testing due to pain without shoes on    Physical Therapy Initial Evaluation: Subjective History     Injury/Condition Details:  Presenting Complaint Left Foot    Onset Timing/Date February 2022  Original injury was discovered after a fall down the stairs.    Mechanism Has Frieberg on her left foot, 2-3 are the most pronounced.   Is having bone remodeling on some of the mets which is promising.   Wants to slow the progression of long term injury.   Never goes barefoot in the house.   Has not been doing any PT since August.     Recently moved back to the [facility] and wants to start PT for \"therapeutic movement\".     Knee joint is cracky and minimal pain. Hip joint is not painful but is concerned      Symptom Behavior Details    Primary Symptoms Constant symptoms; worsen with activity, pain (Location: met heads, all across the top and bottom of the foot, Quality: Aching/Throbbing), stiffness, swelling  Swelling: end of the day but not every day.   Numbness/tingling: denies    Worst Pain 6-7/10 (with see below)   Symptom Provocators Walking without shoes   Walking with shoes is less but present.   \"It hurts all the time\"    Best Pain 2/10    Symptom Relievers See below   Time of day dependent? No   Recent symptom change? no change in symptoms     Prior Testing/Intervention for current condition:  Prior Tests  x-ray and MRI   Prior Treatment Physical Therapy  Orthotics  Acupuncture (with Estim + red light)   Magnesium lotion   Freezer roller ball + spikey ball + muscle rolling pin     Lifestyle & General Medical History:     Usual physical activities  (within past year) Peloton with " special pedals   Pilates with modification  Walking the dog > great pyrenese   Grew up playing tennis.    Orthopaedic history See Epic Chart   Notable medical history See Epic Chart   Patient Reported Health good           Presenting condition or subjective complaint: Femi churchill in left foot  Date of onset: 01/01/24    Relevant medical history: Anemia; Arthritis; Asthma; Concussions; Depression; History of fractures; Migraines or headaches; Pain at night or rest; Severe headaches; Vision problems   Dates & types of surgery: Tos - 2020, gallbladder/appendix removal 1996    Prior diagnostic imaging/testing results: MRI; CT scan; X-ray; Bone scan     Prior therapy history for the same diagnosis, illness or injury: Yes PT    Living Environment  Social support: With a significant other or spouse   Type of home: House; 2-story   Stairs to enter the home: Yes 3 Is there a railing: Yes   Ramp: No   Stairs inside the home: Yes   Is there a railing: Yes   Help at home: None  Equipment owned:       Employment: Yes Consultant  Hobbies/Interests: Pilates, walking my dog, hiking    Patient goals for therapy: I wear steel insoles to prevent my toes from bending - woukd like to do PT for therapeutic movement of toes, prevent hip and knee problems due to my changed gait       Objective   Foot/Ankle Physical Therapy Examination    Dynamic Movement Screen:  2 leg stance: Guarded with weight bearing without shoes on, able to maintain equal weight bearing  2 leg squat: Not assessed    1 leg stance: Right: Not assessed; Left: Not assessed  1 leg squat:   Right: N/T  Left: N/T    Gait: Lacking toe push off (only observed with shoes on)     Range of motion: empty end feel, Actively able to move through full flexion, lacking extension (able to perform active ROM without pain)    Goniometer All motions below, WFL All motions below, WFL   Plantarflexion     Inversion     Eversion     Great Toe Extension (OKC)       Palpation:     Tender to  palpation at the following structures: Met heads (dorsal and plantar aspects), 1st MTP, 5th MTP  NOT tender to palpation at the following structures: PF attachment     Resisted Testing:     Right Left   Plantarflexion - -   Dorsiflexion - -   Eversion - -   Inversion - -   Great Toe Extension - -   Great Toe Flexion - -     Lower Extremity Muscle Strength (x/5)   Right Left   Hip ER 4+/5 4+/5   Hip IR 4+/5 4+/5   Hip ABD 4+/5 4+/5   Hip ADD 4+/5 4+/5   Hip Ext 4+/5 4+/5   Knee Flex 4+/5 4+/5       Assessment & Plan   CLINICAL IMPRESSIONS  Medical Diagnosis: Left foot, Frieberg' Syndrome    Treatment Diagnosis: Left foot met head pain   Impression/Assessment: Patient is a 33 year old female with left foot complaints.  The following significant findings have been identified: Pain, Decreased ROM/flexibility, Decreased joint mobility, Decreased strength, Impaired muscle performance, and Decreased activity tolerance. These impairments interfere with their ability to perform self care tasks, work tasks, recreational activities, household mobility, and community mobility as compared to previous level of function.     Clinical Decision Making (Complexity):  Clinical Presentation: Stable/Uncomplicated  Clinical Presentation Rationale: based on medical and personal factors listed in PT evaluation  Clinical Decision Making (Complexity): Low complexity    PLAN OF CARE  Treatment Interventions:  Interventions: Gait Training, Manual Therapy, Neuromuscular Re-education, Therapeutic Activity, Therapeutic Exercise    Long Term Goals     PT Goal 1  Goal Identifier: Self-Management  Goal Description: Patient to verbalize understanding of self-management and pain management techniques  Rationale: to maximize safety and independence with performance of ADLs and functional tasks  Target Date: 03/26/24      Frequency of Treatment: 2x/month  Duration of Treatment: 2 months    Recommended Referrals to Other Professionals: Physical  Therapy  Education Assessment:   Learner/Method: Patient;No Barriers to Learning    Risks and benefits of evaluation/treatment have been explained.   Patient/Family/caregiver agrees with Plan of Care.     Evaluation Time:     PT Eval, Low Complexity Minutes (06096): 14     Signing Clinician: Ella Hernandez PT

## 2024-01-29 NOTE — NURSING NOTE
Patient presents with:  Follow Up: Return pain      Data Unavailable         What medications are you using for pain? no    (New patients only) Have you been seen by another pain clinic/ provider? no    (Return Patients only) What refills are you needing today? no    Expectations: no    Theo Walls, EMT

## 2024-01-29 NOTE — LETTER
1/29/2024       RE: Melia Braxton  [address]  [place] [address]       Dear Colleague,    Thank you for referring your patient, Melia Braxton, to the [facility] [place] at [facility]. Please see a copy of my visit note below.    [facility] [place] [facility]     Date of visit: 1/29/2024      Assessment:   Melia Braxton is a 33 year old female with a past medical history significant for Freiberg's disease (left), hx anxiety/depression, thoracic outlet syndrome, s/p right TOS decompression (2020), who presents with complaints of right sided neck/shoulder/upper back and right anterior upper chest.      Right sided neck/upper back/anterior chest - Onset of current pain symptoms following thoracic outlet decompression surgery in 2020. Describes pain as constant aching in shoulder and periscapular area, burning quality in medial trapezius and into right side of neck, cramping and sharp along medial scapular border, with fluctuating pain intensity. Intermittent burning radiates into right side of head/temple, intermittent radicular pain into upper arm when pain is severe. Pain worsens with activities that engage use of arms, notes symptoms increase when arms are raised shoulder level or above. She uses a variety of devices at home that target myofascial pain/trigger points and cervical traction, as well as stretching and heat. She reports some degree of benefit with these tools, but benefit not sustained. No prior injections, limited medication trials, never used TENS unit. On exam, BUE strength and sensory intact, cervical ROM is WNL, right sided cervical, trapezius, periscapular myofascial TTP. Etiology is not entirely clear, though seems most consistent with myofascial process, hx of TOS and s/p decompression surgery.      Assigned to [facility] nursing team.     Visit Diagnoses:  1. Neck pain on right side     2. Myofascial pain    3. History of thoracic outlet syndrome    4. Chronic right shoulder pain    5. Anterior chest wall pain        Plan:  Diagnosis reviewed, treatment option addressed, and risk/benifits discussed.  Self-care instructions given.  I am recommending a multidisciplinary treatment plan to help this patient better manage their pain.      Pain Physical Therapy:  NO   -  Current engaged with [facility] P[facility], interested in exploring dry needling. Advised I am happy to provide internal referral for this, if her last visit is within the past 6 months.       Pain Psychologist to address relaxation, behavioral change, coping style, and other factors important to improvement.  NO     Diagnostic Studies:  No recent cervical imaging. May consider in future if indicated.      Medication Management:   Methocarbamol 500-1000 mg up to four times daily as needed. Advised to start with 1 tab (500 mg) and increase to 2 tabs (1000 mg) as tolerated. Monitor for improvement in pain level/intensity. Has not picked up prescription sent in at last visit. Advised to consider trial of medication as tool for days with increased neck/upper back pain. Okay to refill if seen by me within last 6 months.   Monitor for sedation - may cause dizziness or drowsiness. Be careful driving/moving around until you know effects of medication. Avoid concurrent alcohol use.      Potential procedures:   Right sided cervical and periscapular TPI - completed on 1/10/24 with Dr. Montalvo, reports substantial benefit. Discussed repeat in future if needed. Also interested in exploring dry needling.      Other Orders/Referrals:   TENS unit - DME order provided at prior visit. Recommend starting with lowest intensity and increase as tolerated. Copy of DME order provided today in clinic.      Follow up with BILLIE Watson CNP in 3-6 months, or sooner if needed     Review of Electronic Chart: Today I have also reviewed available medical information in the  patient's medical record at [facility] ([facility]) and Care Everywhere (if available), including relevant provider notes, laboratory work, and imaging.     Chantelle Farrar DNP, BILLIE, AGNP-C  [facility]     -------------------------------------------------    Subjective:    Chief complaint:   Chief Complaint   Patient presents with    Follow Up     Return pain       Interval history:  Melia Braxton is a 33 year old female last seen on 12/11/23.  They are a patient of mine seen in follow up.     Recommendations/plan at the last visit included:  Pain Physical Therapy:  NO   -  Completed PT in the past, [facility] [place]      Pain Psychologist to address relaxation, behavioral change, coping style, and other factors important to improvement.  NO     Diagnostic Studies:  No recent cervical imaging. May consider in future if indicated.      Medication Management:   Flexeril recently ordered by PCP, though she was not able to  from pharmacy. Recommend alternative - start methocarbamol 500-1000 mg up to four times daily as needed. Advised to start with 1 tab (500 mg) and increase to 2 tabs (1000 mg) as tolerated. Monitor for improvement in pain level/intensity.   Monitor for sedation - may cause dizziness or drowsiness. Be careful driving/moving around until you know effects of medication. Avoid concurrent alcohol use.      Potential procedures:   Recommend right sided cervical and periscapular TPI. Advised she will schedule with physician partner in clinic for injections.      Other Orders/Referrals:   TENS unit - DME order provided today. Recommend starting with lowest intensity and increase as tolerated.      Follow up with BILLIE Watson CNP in 8 week or sooner if needed    Since her last visit, Melia Braxton reports:  -TPI with Dr. Montalvo 1/10/24, she reports significant benefit in periscapular area/trapezius.   -She has noticed more prominent pain more  "anteriorly on neck, but she is potentially interested trial dry needling.   -She is engaging in PT right now through [facility]. PT advised her on dry needling option.  -She stated her PT does not do it herself, but she would refer her to someone, though she open to recommendations.   -Methocarbamol recommended at last visit, she states she never picked up the prescription.   -She has been hesitant to trial muscle relaxants due to adverse experience in 2021 when she had a significant muscle spasms in low back and could not walk.   -TENS unit ordered at last visit. She did not  this unit but would like to get another copy of the order.     Pain Information:   Pain rating: averages 4/10 on a 0-10 scale.        HPI from initial visit with me on 12/11/23:  Melia Braxton is a 33 year old female presents with a chief complaint of right sided neck and shoulder, anterior upper chest wall pain, intermittent radiating pain into upper arm (does not extend into lower arm/pain).      The pain has been present for since TOS decompression surgery in 2020 .    The pain is Moderate Pain (5) in severity.    The pain is described as constant aching in shoulder and periscapular area, burning quality in medial trap and into right side of neck, cramping and sharp along medial scapular border, fluctuating intensity. Intermittent burning radiates into right side of head/temple, intermittent radicular pain into upper arm when pain is severe.   The pain is alleviated by stretching, theragun massage device, \"The Knuckle\" device that lay (sounds like cervical traction, states she saw online), heat, spine fitter acupressure trigger point tool, cervical spine roller (very helpful), foam roller.    It is exacerbated by vacuuming house, blowing dry hair. Household chores, activities that really engage arms. Pain is worse in the morning after sleeping.   Modalities that have been utilized in the past which were helpful include PT (Courage " Nitin - helpful to some extent but benefit not sustained), awkward positioning with neck (while sitting on couch, lying in bed, working at computer).    Things that were not helpful, but tried ,include PT (no sustained benefit).    The patient has never tried Botox, medications, TENS unit.  Melia Braxton has not been seen at a pain clinic in the past.       -She had onset of migraine in 2020 that lasted 3 months, saw neurology and was dx with thoracic outlet (vascular). She then had surgery and onset of right anterior upper chest wall, shoulder and neck pain.   -She had PT and surgery here in , then moved to [place] for 2 years while  was in grad school in 2021, then moved back here a few months ago.   -She is doing one on one pilates  -She lives with , no children yet.   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         Current Pain Treatments:    Medications:         Rizatriptan PRN              Methocarbamol 500-1000 mg QID PRN     2. Other therapies:                             TENS   Dry needling - considering trial through [facility]      Review of [place] Prescription Monitoring Program (): No concern for abuse or misuse of controlled medications based on this report.     Past pain treatments:  TPI 1/10/24 - significant benefit.     Medications:  Current Outpatient Medications   Medication Sig Dispense Refill    albuterol (PROAIR HFA/PROVENTIL HFA/VENTOLIN HFA) 108 (90 Base) MCG/ACT inhaler Inhale 2 puffs into the lungs every 6 hours as needed for shortness of breath, wheezing or cough 18 g 1    ALPRAZolam (XANAX) 0.5 MG tablet       cyclobenzaprine (FLEXERIL) 5 MG tablet Take 1 tablet (5 mg) by mouth 3 times daily as needed for muscle spasms 30 tablet 0    methocarbamol (ROBAXIN) 500 MG tablet Take 1-2 tablets (500-1,000 mg) by mouth 4 times daily as needed for muscle spasms 60 tablet 1    methylphenidate (RITALIN) 10 MG tablet Take 1 tablet by mouth  daily      paragard intrauterine copper 1 each by Intrauterine route once      rizatriptan (MAXALT) 10 MG tablet Take 1 tablet (10 mg) by mouth at onset of headache for migraine May repeat in 2 hours. Max 3 tablets/24 hours. 12 tablet 0       Medical History: any changes in medical history since they were last seen? No    Objective:    Physical Exam:  Blood pressure 139/79, pulse 76, SpO2 99%.  Constitutional: Well developed, well nourished, appears stated age.  Gait: Intact   HEENT: Head atraumatic, normocephalic. Eyes without conjunctival injection or jaundice. Oropharynx clear. Neck supple. No obvious neck masses.  Skin: No rash, lesions, or petechiae of exposed skin.   Psychiatric/mental status: Alert, without lethargy or stupor. Speech fluent. Appropriate affect. Mood normal. Able to follow commands without difficulty.     Diagnostic Tests/Imaging/Labs:  [facility] 11/13/23 - WNL    BILLING TIME DOCUMENTATION:   The total TIME spent on this patient on the date of the encounter/appointment was 25 minutes.      TOTAL TIME includes:   Time spent preparing to see the patient (reviewing records and tests)   Time spent face to face (or over the phone) with the patient   Time spent ordering tests, medications, procedures and referrals   Time spent Referring and communicating with other healthcare professionals   Time spent documenting clinical information in Epic     Again, thank you for allowing me to participate in the care of your patient.      Sincerely,    BILLIE Watson CNP

## 2024-01-30 ENCOUNTER — TELEPHONE (OUTPATIENT)
Dept: ANESTHESIOLOGY | Facility: CLINIC | Age: 34
End: 2024-01-30
Payer: COMMERCIAL

## 2024-01-30 PROBLEM — M79.672 LEFT FOOT PAIN: Status: ACTIVE | Noted: 2024-01-30

## 2024-02-02 ENCOUNTER — TELEPHONE (OUTPATIENT)
Dept: ANESTHESIOLOGY | Facility: CLINIC | Age: 34
End: 2024-02-02
Payer: COMMERCIAL

## 2024-02-02 NOTE — TELEPHONE ENCOUNTER
Left Voicemail (1st Attempt) for the patient to call back and schedule the following:    Appointment type: Return  Provider: Chantelle Farrar  Return date: Approx. May 29  Specialty phone number: [phone]

## 2024-02-02 NOTE — PATIENT INSTRUCTIONS
Pain Physical Therapy:  NO   -  Current engaged with Kevin PISANO, interested in exploring dry needling. Advised I am happy to provide internal referral for this, if her last visit is within the past 6 months.       Pain Psychologist to address relaxation, behavioral change, coping style, and other factors important to improvement.  NO     Diagnostic Studies:  No recent cervical imaging. May consider in future if indicated.      Medication Management:   Methocarbamol 500-1000 mg up to four times daily as needed. Advised to start with 1 tab (500 mg) and increase to 2 tabs (1000 mg) as tolerated. Monitor for improvement in pain level/intensity. Has not picked up prescription sent in at last visit. Advised to consider trial of medication as tool for days with increased neck/upper back pain. Okay to refill if seen by me within last 6 months.   Monitor for sedation - may cause dizziness or drowsiness. Be careful driving/moving around until you know effects of medication. Avoid concurrent alcohol use.      Potential procedures:   Right sided cervical and periscapular TPI - completed on 1/10/24 with Dr. Montalvo, reports substantial benefit. Discussed repeat in future if needed. Also interested in exploring dry needling.      Other Orders/Referrals:   TENS unit - DME order provided at prior visit. Recommend starting with lowest intensity and increase as tolerated. Copy of DME order provided today in clinic.      Follow up with BILLIE Watson CNP in 3-6 months, or sooner if needed

## 2024-02-05 ENCOUNTER — MYC MEDICAL ADVICE (OUTPATIENT)
Dept: FAMILY MEDICINE | Facility: CLINIC | Age: 34
End: 2024-02-05
Payer: COMMERCIAL

## 2024-02-05 DIAGNOSIS — H53.8 BLURRED VISION: Primary | ICD-10-CM

## 2024-02-05 NOTE — TELEPHONE ENCOUNTER
FS,  Please see below Peppercoint message and advise.  Pended order to [facility]  Thanks,  Selma LUCERO RN

## 2024-02-20 ENCOUNTER — TRANSFERRED RECORDS (OUTPATIENT)
Dept: HEALTH INFORMATION MANAGEMENT | Facility: CLINIC | Age: 34
End: 2024-02-20
Payer: COMMERCIAL

## 2024-02-26 ENCOUNTER — THERAPY VISIT (OUTPATIENT)
Dept: PHYSICAL THERAPY | Facility: CLINIC | Age: 34
End: 2024-02-26
Payer: COMMERCIAL

## 2024-02-26 DIAGNOSIS — M79.672 LEFT FOOT PAIN: Primary | ICD-10-CM

## 2024-02-26 PROCEDURE — 97110 THERAPEUTIC EXERCISES: CPT | Mod: GP | Performed by: PHYSICAL THERAPIST

## 2024-02-26 PROCEDURE — 97140 MANUAL THERAPY 1/> REGIONS: CPT | Mod: GP | Performed by: PHYSICAL THERAPIST

## 2024-03-06 DIAGNOSIS — M92.72 FREIBERG'S INFRACTION, LEFT: Primary | ICD-10-CM

## 2024-03-06 DIAGNOSIS — M79.672 LEFT FOOT PAIN: ICD-10-CM

## 2024-03-23 NOTE — PATIENT INSTRUCTIONS
Medications:    methocarbamol (ROBAXIN) 500 MG tablet. Take 1-2 tablets (500-1,000 mg) by mouth 4 times daily as needed for muscle spasms. Start with one tab initially to see how it affects you.    *Please provide the clinic with a minium of 1 week notice, on all prescription refills.         Treatment planning:      Pain PT options can be discussed at next visit.    TENS Unit ordered    [facility]:        [facility] [place]  [address] [place] [address], [address]  [place] [address] [phone]          [facility] [place]  [address] [place] [address] [phone]        [facility] [place]  [address] [place] [address] [phone]        [facility] - [place], [place] [id number] [phone]        [facility] [place] [address] [phone]        [facility] - [place], [place] [id number] [phone]       Recommended Follow up:      Follow up 2-4 weeks after the trigger point injections.         Please call [phone] to schedule your clinic appointment if you don't already have an appointment scheduled.        To speak with a nurse, schedule/reschedule/cancel a clinic appointment, or request a medication refill call: [phone]    You can also reach us by [employer]: [email or web address]   
No

## 2024-10-29 NOTE — PROGRESS NOTES
Answers submitted by the patient for this visit:  Patient Health Questionnaire (Submitted on 10/31/2024)  If you checked off any problems, how difficult have these problems made it for you to do your work, take care of things at home, or get along with other people?: Somewhat difficult  PHQ9 TOTAL SCORE: 7  Patient Health Questionnaire (G7) (Submitted on 10/31/2024)  GERTRUDE 7 TOTAL SCORE: 11  Melia is a 33 year old  female who presents for annual exam.     Besides routine health maintenance, she has no other health concerns today .    HPI:  The patient's PCP is *** Physician No Ref-Primary.  ***      GYNECOLOGIC HISTORY:    No LMP recorded. (Menstrual status: IUD).    Regular menses? yes  Menses every 28-30 days.  Length of menses: 7 days    Her current contraception method is: IUD.  She  reports that she has never smoked. She has never been exposed to tobacco smoke. She has never used smokeless tobacco.    Patient is sexually active.  STD testing offered?  Declined  Last PHQ-9 score on record =        No data to display              Last GAD7 score on record =       2023     8:57 AM   GERTRUDE-7 SCORE   Total Score 17 (severe anxiety)   Total Score 17     Alcohol Score = ***    HEALTH MAINTENANCE:  Cholesterol: (  Cholesterol   Date Value Ref Range Status   2024 183 <200 mg/dL Final     Comment:     Age 2-19 years  Desirable: <170 mg/dL  Borderline high:  170-199 mg/dl  High:            >199 mg/dl    Age 20 years and older  Desirable: <200 mg/dL     Last Mammo: Not applicable, Result: Not applicable, Next Mammo: Due at age 40 years old.   Pap: (  Lab Results   Component Value Date    GYNINTERP  2024     Negative for Intraepithelial Lesion or Malignancy (NILM)     Colonoscopy:  n/a, Result: Not applicable, Next Colonoscopy: Due at age 45 years old.   Dexa:  Due at age 55-65 years old     Health maintenance updated:  {yes no:470622}  Care Gaps  Close care gaps  Overdue          Never done ASTHMA  ACTION PLAN (Yearly)     Never done ADVANCE CARE PLANNING (Every 5 Years)     Never done Pneumococcal Vaccine: Pediatrics (0 to 5 Years) and At-Risk Patients (6 to 64 Years) (1 of 2 - PCV)     AUG 16  2004 HEPATITIS B IMMUNIZATION (2 of 3 - 3-dose series)   Last completed: 2004     MAY 13  2024 ASTHMA CONTROL TEST (Every 6 Months)  Last completed: 2023     SEP 1  2024 INFLUENZA VACCINE (1)  Last completed: 2016     SEP 1  2024 COVID-19 Vaccine ( season)  Last completed: 2023       HISTORY:  OB History    Para Term  AB Living   2 0 0 0 2 0   SAB IAB Ectopic Multiple Live Births   0 0 0 0 0      # Outcome Date GA Lbr Juaquin/2nd Weight Sex Type Anes PTL Lv   2 AB            1 AB                Patient Active Problem List   Diagnosis    Asthma    Anxiety    Depression    Acne vulgaris    ASCUS with positive high risk HPV cervical    Enlarged lymph nodes    IUD (intrauterine device) in place    Freiberg's disease, left    Iron deficiency    Left foot pain     Past Surgical History:   Procedure Laterality Date    BREAST SURGERY      paraclavicular thoracic outlet decompression Right 2020      Social History     Tobacco Use    Smoking status: Never     Passive exposure: Never    Smokeless tobacco: Never   Substance Use Topics    Alcohol use: Yes     Alcohol/week: 0.0 standard drinks of alcohol     Comment: 5-6 drinks per week      Problem (# of Occurrences) Relation (Name,Age of Onset)    Cancer (1) Father           Negative family history of: Coronary Artery Disease, Cerebrovascular Disease, Glaucoma, Macular Degeneration              Current Outpatient Medications   Medication Sig Dispense Refill    albuterol (PROAIR HFA/PROVENTIL HFA/VENTOLIN HFA) 108 (90 Base) MCG/ACT inhaler Inhale 2 puffs into the lungs every 6 hours as needed for shortness of breath, wheezing or cough 18 g 1    ALPRAZolam (XANAX) 0.5 MG tablet       cyclobenzaprine (FLEXERIL) 5 MG tablet Take  "1 tablet (5 mg) by mouth 3 times daily as needed for muscle spasms 30 tablet 0    methocarbamol (ROBAXIN) 500 MG tablet Take 1-2 tablets (500-1,000 mg) by mouth 4 times daily as needed for muscle spasms 60 tablet 1    methylphenidate (RITALIN) 10 MG tablet Take 1 tablet by mouth daily      paragard intrauterine copper 1 each by Intrauterine route once      rizatriptan (MAXALT) 10 MG tablet Take 1 tablet (10 mg) by mouth at onset of headache for migraine May repeat in 2 hours. Max 3 tablets/24 hours. 12 tablet 0     Current Facility-Administered Medications   Medication Dose Route Frequency Provider Last Rate Last Admin    triamcinolone (KENALOG-40) injection 40 mg  40 mg Other Once Shara Montalvo MD         No Known Allergies    Past medical, surgical, social and family histories were reviewed and updated in EPIC.    ROS:   {[facility] ROSGYN:206881::\"12 point review of systems negative other than symptoms noted below or in the HPI.\"}  {ROS - :360943}    EXAM:  There were no vitals taken for this visit.   BMI: There is no height or weight on file to calculate BMI.    PHYSICAL EXAM:  Constitutional:   Appearance: Well nourished, well developed, alert, in no acute distress  Neck:  Lymph Nodes:  No lymphadenopathy present    Thyroid:  Gland size normal, nontender, no nodules or masses present  on palpation  Chest:  Respiratory Effort:  Breathing unlabored  Cardiovascular:    Heart: Auscultation:  Regular rate, normal rhythm, no murmurs present  Breasts: {[facility] Breast exam:253911}  Gastrointestinal:   Abdominal Examination:  Abdomen nontender to palpation, tone normal without rigidity or guarding, no masses present, umbilicus without lesions   Liver and Spleen:  No hepatomegaly present, liver nontender to palpation    Hernias:  No hernias present  Lymphatic: Lymph Nodes:  No other lymphadenopathy present  Skin:  General Inspection:  No rashes present, no lesions present, no areas of  discoloration  Neurologic:    Mental " "Status:  Oriented X3.  Normal strength and tone, sensory exam                grossly normal, mentation intact and speech normal.    Psychiatric:   Mentation appears normal and affect normal/bright.         {PLC Pelvic Exam:598545}    COUNSELING:   {FEMALE COUNSELING MESSAGES:839601::\"Reviewed preventive health counseling, as reflected in patient instructions\"}    BMI: There is no height or weight on file to calculate BMI.  {Obesity Action Plan -- Delete if BMA < 25:874474}    ASSESSMENT:  33 year old female with satisfactory annual exam.  No diagnosis found.    PLAN:  ***    Amparo Hoffman MD    "

## 2024-10-31 ENCOUNTER — OFFICE VISIT (OUTPATIENT)
Dept: OBGYN | Facility: CLINIC | Age: 34
End: 2024-10-31
Payer: COMMERCIAL

## 2024-10-31 VITALS
HEIGHT: 67 IN | BODY MASS INDEX: 22.1 KG/M2 | DIASTOLIC BLOOD PRESSURE: 68 MMHG | WEIGHT: 140.8 LBS | SYSTOLIC BLOOD PRESSURE: 120 MMHG

## 2024-10-31 DIAGNOSIS — Z11.3 SCREENING FOR STD (SEXUALLY TRANSMITTED DISEASE): Primary | ICD-10-CM

## 2024-10-31 DIAGNOSIS — N89.8 VAGINAL DISCHARGE: ICD-10-CM

## 2024-10-31 DIAGNOSIS — N30.00 ACUTE CYSTITIS WITHOUT HEMATURIA: ICD-10-CM

## 2024-10-31 LAB
BACTERIAL VAGINOSIS VAG-IMP: NEGATIVE
CANDIDA DNA VAG QL NAA+PROBE: NOT DETECTED
CANDIDA GLABRATA / CANDIDA KRUSEI DNA: NOT DETECTED
HBV SURFACE AG SERPL QL IA: NONREACTIVE
HCV AB SERPL QL IA: NONREACTIVE
T PALLIDUM AB SER QL: NONREACTIVE
T VAGINALIS DNA VAG QL NAA+PROBE: NOT DETECTED

## 2024-10-31 PROCEDURE — 99215 OFFICE O/P EST HI 40 MIN: CPT | Performed by: OBSTETRICS & GYNECOLOGY

## 2024-10-31 PROCEDURE — 86780 TREPONEMA PALLIDUM: CPT | Performed by: OBSTETRICS & GYNECOLOGY

## 2024-10-31 PROCEDURE — 36415 COLL VENOUS BLD VENIPUNCTURE: CPT | Performed by: OBSTETRICS & GYNECOLOGY

## 2024-10-31 PROCEDURE — 0352U MULTIPLEX VAGINAL PANEL BY PCR: CPT | Performed by: OBSTETRICS & GYNECOLOGY

## 2024-10-31 PROCEDURE — 99459 PELVIC EXAMINATION: CPT | Performed by: OBSTETRICS & GYNECOLOGY

## 2024-10-31 PROCEDURE — 87389 HIV-1 AG W/HIV-1&-2 AB AG IA: CPT | Performed by: OBSTETRICS & GYNECOLOGY

## 2024-10-31 PROCEDURE — 87340 HEPATITIS B SURFACE AG IA: CPT | Performed by: OBSTETRICS & GYNECOLOGY

## 2024-10-31 PROCEDURE — 86803 HEPATITIS C AB TEST: CPT | Performed by: OBSTETRICS & GYNECOLOGY

## 2024-10-31 PROCEDURE — 87102 FUNGUS ISOLATION CULTURE: CPT | Performed by: OBSTETRICS & GYNECOLOGY

## 2024-10-31 RX ORDER — AMOXICILLIN 500 MG/1
CAPSULE ORAL
COMMUNITY
Start: 2024-10-26

## 2024-10-31 RX ORDER — HYDROXYZINE HYDROCHLORIDE 25 MG/1
TABLET, FILM COATED ORAL
COMMUNITY
Start: 2023-12-01

## 2024-10-31 RX ORDER — LORAZEPAM 1 MG/1
TABLET ORAL
COMMUNITY
Start: 2023-12-01

## 2024-10-31 ASSESSMENT — ANXIETY QUESTIONNAIRES
7. FEELING AFRAID AS IF SOMETHING AWFUL MIGHT HAPPEN: NOT AT ALL
IF YOU CHECKED OFF ANY PROBLEMS ON THIS QUESTIONNAIRE, HOW DIFFICULT HAVE THESE PROBLEMS MADE IT FOR YOU TO DO YOUR WORK, TAKE CARE OF THINGS AT HOME, OR GET ALONG WITH OTHER PEOPLE: SOMEWHAT DIFFICULT
5. BEING SO RESTLESS THAT IT IS HARD TO SIT STILL: SEVERAL DAYS
2. NOT BEING ABLE TO STOP OR CONTROL WORRYING: NEARLY EVERY DAY
8. IF YOU CHECKED OFF ANY PROBLEMS, HOW DIFFICULT HAVE THESE MADE IT FOR YOU TO DO YOUR WORK, TAKE CARE OF THINGS AT HOME, OR GET ALONG WITH OTHER PEOPLE?: SOMEWHAT DIFFICULT
4. TROUBLE RELAXING: SEVERAL DAYS
3. WORRYING TOO MUCH ABOUT DIFFERENT THINGS: MORE THAN HALF THE DAYS
7. FEELING AFRAID AS IF SOMETHING AWFUL MIGHT HAPPEN: NOT AT ALL
GAD7 TOTAL SCORE: 11
1. FEELING NERVOUS, ANXIOUS, OR ON EDGE: NEARLY EVERY DAY
GAD7 TOTAL SCORE: 11
6. BECOMING EASILY ANNOYED OR IRRITABLE: SEVERAL DAYS
GAD7 TOTAL SCORE: 11

## 2024-10-31 ASSESSMENT — PATIENT HEALTH QUESTIONNAIRE - PHQ9
SUM OF ALL RESPONSES TO PHQ QUESTIONS 1-9: 7
10. IF YOU CHECKED OFF ANY PROBLEMS, HOW DIFFICULT HAVE THESE PROBLEMS MADE IT FOR YOU TO DO YOUR WORK, TAKE CARE OF THINGS AT HOME, OR GET ALONG WITH OTHER PEOPLE: SOMEWHAT DIFFICULT
SUM OF ALL RESPONSES TO PHQ QUESTIONS 1-9: 7

## 2024-10-31 NOTE — PROGRESS NOTES
"Answers submitted by the patient for this visit:  Patient Health Questionnaire (Submitted on 10/31/2024)  If you checked off any problems, how difficult have these problems made it for you to do your work, take care of things at home, or get along with other people?: Somewhat difficult  PHQ9 TOTAL SCORE: 7  Patient Health Questionnaire (G7) (Submitted on 10/31/2024)  GERTRUDE 7 TOTAL SCORE: 11    SUBJECTIVE:                                                   Melia Braxton is a 33 year old female who presents to clinic today for the following health issue(s):  Patient presents with:    Additional information: Here to discuss GBS UTI.     HPI:  Patient experiencing vaginal itching and discharge as well as intermittent abdominal pain for a few weeks. She reports a new relationship. Patient herself began to experience the above symptoms so presented to [facility]. She showed me her test results and GN/CT was testing. No yeast infection testing. She represented on due to persistent symptoms and worsening abdominal pain on the 24th to [facility]. GN/CT  and trichomonas testing was negative. UA was grossly normal and Ucx positive for GBS (10-50K). She was stated on a 7 day treatment of amoxicillin. She is on day 4 of treatment and feels like her symptoms are starting to improve. Symptoms did not feel like a UTI (no dysuria, no odor). Does feel the vaginal tissue is more \"sensitive\". No back pain. No abnormal bleeding.   Hx STI 10 years ago.   Is sexually active and would like blood testing today. She is concerned about colonization and risk of subsequent reinfection.   She does not have a PCP. Declines COVID/flu today.   Has Paragard IUD.    No LMP recorded (lmp unknown). (Menstrual status: IUD)..     Patient is sexually active, .   reports that she has never smoked. She has never been exposed to tobacco smoke. She has never used smokeless tobacco.    STD testing offered?  Declined    Health maintenance updated: "  yes    Today's PHQ-2 Score:       1/8/2024    11:19 AM   PHQ-2 ( 1999 [employer])   Q1: Little interest or pleasure in doing things 0   Q2: Feeling down, depressed or hopeless 0   PHQ-2 Score 0     Today's PHQ-9 Score:       10/31/2024     8:38 AM   PHQ-9 SCORE   PHQ-9 Total Score MyChart 7 (Mild depression)   PHQ-9 Total Score 7        Patient-reported     Today's GERTRUDE-7 Score:       10/31/2024     8:38 AM   GERTRUDE-7 SCORE   Total Score 11 (moderate anxiety)   Total Score 11        Patient-reported       Problem list and histories reviewed & adjusted, as indicated.  Additional history: as documented.    Patient Active Problem List   Diagnosis    Asthma    Anxiety    Depression    Acne vulgaris    ASCUS with positive high risk HPV cervical    Enlarged lymph nodes    IUD (intrauterine device) in place    Freiberg's disease, left    Iron deficiency    Left foot pain     Past Surgical History:   Procedure Laterality Date    BREAST SURGERY      paraclavicular thoracic outlet decompression Right 11/2020      Social History     Tobacco Use    Smoking status: Never     Passive exposure: Never    Smokeless tobacco: Never   Substance Use Topics    Alcohol use: Yes     Alcohol/week: 0.0 standard drinks of alcohol     Comment: 5-6 drinks per week      Problem (# of Occurrences) Relation (Name,Age of Onset)    Cancer (1) Father           Negative family history of: Coronary Artery Disease, Cerebrovascular Disease, Glaucoma, Macular Degeneration              Current Outpatient Medications   Medication Sig Dispense Refill    albuterol (PROAIR HFA/PROVENTIL HFA/VENTOLIN HFA) 108 (90 Base) MCG/ACT inhaler Inhale 2 puffs into the lungs every 6 hours as needed for shortness of breath, wheezing or cough 18 g 1    ALPRAZolam (XANAX) 0.5 MG tablet       amoxicillin (AMOXIL) 500 MG capsule TAKE 1 CAPSULE (500 MG TOTAL) BY MOUTH EVERY 8 (EIGHT) HOURS FOR 7 DAYS      hydrOXYzine HCl (ATARAX) 25 MG tablet TAKE 1 TABLET 1-3 TIMES DAILY AS NEEDED  "FOR ANXIETY.      LORazepam (ATIVAN) 1 MG tablet TAKE 1/2-1 TABLET TWICE DAILY AS NEEDED FOR ANXIETY OR PANIC.      methylphenidate (RITALIN) 10 MG tablet Take 1 tablet by mouth daily       No current facility-administered medications for this visit.     No Known Allergies    ROS:  12 point review of systems negative other than symptoms noted below or in the HPI.  No urinary frequency or dysuria, bladder or kidney problems      OBJECTIVE:     /68   Ht 1.702 m (5' 7\")   Wt 63.9 kg (140 lb 12.8 oz)   LMP  (LMP Unknown)   BMI 22.05 kg/m    Body mass index is 22.05 kg/m .    Exam:  Constitutional:  Appearance: Well nourished, well developed alert, in no acute distress  Back: no CVA tenderness bilaterally  Pelvic Exam:  External Genitalia:     Normal appearance for age, no discharge present, no tenderness present, no inflammatory lesions present, color normal  Vagina:     Normal vaginal vault without central or paravaginal defects, small amount of white discharge present, no inflammatory lesions present, no masses present  Bladder:     Nontender to palpation  Urethra:   Urethral Body:  Urethra palpation normal, urethra structural support normal   Urethral Meatus:  No erythema or lesions present  Cervix:     Appearance healthy, no lesions present, nontender to palpation, no bleeding present, no CMT  Uterus:     Uterus: firm, normal sized and nontender, anteverted in position.   Adnexa:     No adnexal tenderness present, no adnexal masses present  Perineum:     Perineum within normal limits, no evidence of trauma, no rashes or skin lesions present    Marquise Viramontes MA present as chaperone during sensitive exam.       In-Clinic Test Results:  No results found for this or any previous visit (from the past 24 hours).    ASSESSMENT/PLAN:                                                        ICD-10-CM    1. Screening for STD (sexually transmitted disease)  Z11.3 HIV Antigen Antibody Combo Cascade     Treponema " Abs w Reflex to RPR and Titer     Hepatitis B surface antigen     Hepatitis C antibody     HIV Antigen Antibody Combo Cascade     Treponema Abs w Reflex to RPR and Titer     Hepatitis B surface antigen     Hepatitis C antibody      2. Acute cystitis without hematuria  N30.00 Urine Culture Aerobic Bacterial - lab collect     UA with Microscopic - lab collect      3. Vaginal discharge  N89.8 Multiplex Vaginal Panel by PCR     Yeast culture     Multiplex Vaginal Panel by PCR        Here for follow up of recent GBS+UTI (10-50K) and ongoing vaginal discharge and itching. As no yeast testing has been completed, proceeding with that as well as BV and full vaginitis panel. If positive, anticipate this is the source of her symptoms rather than Ucx or other STI (x2 neg testing). Blood STI (HIV, syphilis, Hepatitis B/C) testing collected today. Discussed while GBS can be source of uncomplicated UTIs and resolve with treatment (is on course of amoxicillin)., also discussed possible colonization of vagina or bladder (typically consider positive Ucx >10^5 CFU). While her symptoms do not sound consistent with typical UTI, no evidence of systemic symptoms of flank pain to suggest renal involvement. Additionally, among otherwise healthy women, GBS isolation commonly represents contamination and with low growths. Discussed no role for partner treatment in setting of Ucx results. Reviewed is on appropriate treatment for acute simple cystitis caused by GBS (amoxicillin 500 mg q8 hrs x7 days). Offered MYA in 1 month (discussed colonization considerations) and lab only visit ordered.    Amparo Hoffman MD  [facility] [place]    On 10/31/24, a total of 45 minutes was spent caring for the patient, reviewing labs/vitals and EPIC notes, placing orders, documenting and coordinating care, and in medical decision making,.

## 2024-11-01 ENCOUNTER — TELEPHONE (OUTPATIENT)
Dept: OBGYN | Facility: CLINIC | Age: 34
End: 2024-11-01
Payer: COMMERCIAL

## 2024-11-01 ENCOUNTER — MYC MEDICAL ADVICE (OUTPATIENT)
Dept: OBGYN | Facility: CLINIC | Age: 34
End: 2024-11-01
Payer: COMMERCIAL

## 2024-11-01 LAB — HIV 1+2 AB+HIV1 P24 AG SERPL QL IA: NONREACTIVE

## 2024-11-01 NOTE — TELEPHONE ENCOUNTER
Pt also has concerns with progress note.    In Vaginal Problem at top: concerns with Streph B and NOT staph  HPI:  -wants note to reflect she had a new relationship and not discuss her partner as they have not been tested.  Also of note she HAS a ParaGard IUD, not just hx of it.    Wants notes to be accurate and reflective of her situation.    Also please see other encounter to review billing portion    Routing pt mychart message to provider to advise.  Barney Hercules RN on 11/1/2024 at 8:43 AM   [facility]

## 2024-11-01 NOTE — TELEPHONE ENCOUNTER
Pt calling concerned with appt from yesterday.  Originally told it was an annual but it was not--more so problem visit.    10/31/24: Ivonne   Patient presents with:  Vaginal Problem: Patient was seen at the [facility] clinic and was told she has a staph b infection, and would like to follow up    Visit type still noted as annual.  Pt wants to ensure she will not be billed as an annual.    Routing pt [employer] message to provider to advise.  How will you be billing this? Does it matter that visit type does note annual?  Pt wants call back  Barney Hercules RN on 11/1/2024 at 8:26 AM   [facility]

## 2024-11-01 NOTE — TELEPHONE ENCOUNTER
Pt updated that note was updated by Dr Hoffman  Still working on visit coding.  Barney Hercules RN on 11/1/2024 at 10:05 AM   [facility]

## 2024-11-01 NOTE — TELEPHONE ENCOUNTER
[facility] Call Center    Phone Message    May a detailed message be left on voicemail: yes     Reason for Call: Other: Patient is calling asking to speak to the provider to discuss the notes that were put into mychart to clarify information. Some of the health history is incorrect and would to discuss and possibly change. Please call her back.      Action Taken: Other: obgyn    Travel Screening: Not Applicable     Date of Service:

## 2024-11-04 LAB — BACTERIA SPEC CULT: NO GROWTH

## 2024-11-12 ENCOUNTER — E-VISIT (OUTPATIENT)
Dept: OBGYN | Facility: CLINIC | Age: 34
End: 2024-11-12
Payer: COMMERCIAL

## 2024-11-12 DIAGNOSIS — N89.8 VAGINAL DISCHARGE: Primary | ICD-10-CM

## 2024-11-12 PROCEDURE — 99207 PR NO CHARGE LOS: CPT | Performed by: OBSTETRICS & GYNECOLOGY

## 2024-11-14 ENCOUNTER — TELEPHONE (OUTPATIENT)
Dept: OBGYN | Facility: CLINIC | Age: 34
End: 2024-11-14

## 2024-11-14 NOTE — TELEPHONE ENCOUNTER
Amparo Hoffman MD Eggerud, Linda BEDOYA, RN; P [facility]  Caller: Unspecified (Today,  9:47 AM)  Since the only thing that returned positive on her recent evaluation was Ucx, we can order a repeat urine culture and UA to reassess (I previously placed this order but let me know if you need me to order again). Pending results, future appointment may still be needed since many things need specialized swabs for testing.    Called pt and left detailed vm - orders for UA/UC are in and can make a lab only visit at any  lab location for urine drop off and Dr Hoffman will respond in result message. May need future apt but will hear from Dr Hoffman.    Linda Taylor, RN on 11/14/2024 at 1:10 PM

## 2024-11-14 NOTE — TELEPHONE ENCOUNTER
[facility] Call Center    Phone Message    May a detailed message be left on voicemail: yes     Reason for Call: Other: Pt has not gotten a response on an e-visit after 2 days and is looking for guidance. Please contact pt to advise.      Action Taken: Message routed to:  Other: [facility]    Travel Screening: Not Applicable     Date of Service:

## 2024-11-14 NOTE — TELEPHONE ENCOUNTER
Patient returning call    Cannot make the 1530 apt work w Dr Hoffman today  Symptoms are worsening/persisting and asking if it's repeat labs, can she leave a urine at a closer location to her and do an e-visit still or virtual visit?    Was hoping for abx over the phone but RN explained her last visit labs were all negative so really tx is not indicated. Needs a conversation and repeat lab work.    Linda Taylor RN on 11/14/2024 at 10:22 AM

## 2024-11-14 NOTE — TELEPHONE ENCOUNTER
RN spoke w patient  Dr Hoffman out of the office yesterday. Per notes on her evisit has been dealing w sx for 2 weeks. Offered pt an apt today w Dr Hoffman and pt accepted.    Dr Hoffman aware of change.    Linda Taylor RN on 11/14/2024 at 10:05 AM

## 2024-11-17 NOTE — TELEPHONE ENCOUNTER
Provider E-Visit time total (minutes): 0    No charge visit.  Instead addressed through myChart communication. Offered repeat urine testing as initial evaluation.

## 2025-02-22 ENCOUNTER — HEALTH MAINTENANCE LETTER (OUTPATIENT)
Age: 35
End: 2025-02-22

## 2025-08-17 ENCOUNTER — OFFICE VISIT (OUTPATIENT)
Dept: URGENT CARE | Facility: URGENT CARE | Age: 35
End: 2025-08-17
Payer: COMMERCIAL

## 2025-08-17 VITALS
DIASTOLIC BLOOD PRESSURE: 67 MMHG | TEMPERATURE: 98 F | RESPIRATION RATE: 16 BRPM | WEIGHT: 140 LBS | BODY MASS INDEX: 21.93 KG/M2 | OXYGEN SATURATION: 98 % | SYSTOLIC BLOOD PRESSURE: 102 MMHG | HEART RATE: 87 BPM

## 2025-08-17 DIAGNOSIS — H66.001 NON-RECURRENT ACUTE SUPPURATIVE OTITIS MEDIA OF RIGHT EAR WITHOUT SPONTANEOUS RUPTURE OF TYMPANIC MEMBRANE: Primary | ICD-10-CM

## 2025-08-17 PROCEDURE — 3074F SYST BP LT 130 MM HG: CPT | Performed by: FAMILY MEDICINE

## 2025-08-17 PROCEDURE — 99213 OFFICE O/P EST LOW 20 MIN: CPT | Performed by: FAMILY MEDICINE

## 2025-08-17 PROCEDURE — 3078F DIAST BP <80 MM HG: CPT | Performed by: FAMILY MEDICINE

## (undated) RX ORDER — BUPIVACAINE HYDROCHLORIDE 2.5 MG/ML
INJECTION, SOLUTION EPIDURAL; INFILTRATION; INTRACAUDAL
Status: DISPENSED
Start: 2024-01-10

## (undated) RX ORDER — LIDOCAINE HYDROCHLORIDE 20 MG/ML
INJECTION, SOLUTION INFILTRATION; PERINEURAL
Status: DISPENSED
Start: 2024-01-10